# Patient Record
Sex: MALE | Race: WHITE | Employment: PART TIME | URBAN - NONMETROPOLITAN AREA
[De-identification: names, ages, dates, MRNs, and addresses within clinical notes are randomized per-mention and may not be internally consistent; named-entity substitution may affect disease eponyms.]

---

## 2017-04-25 ENCOUNTER — OFFICE VISIT (OUTPATIENT)
Dept: GASTROENTEROLOGY | Age: 62
End: 2017-04-25
Payer: COMMERCIAL

## 2017-04-25 VITALS
WEIGHT: 223 LBS | HEART RATE: 84 BPM | DIASTOLIC BLOOD PRESSURE: 70 MMHG | HEIGHT: 73 IN | SYSTOLIC BLOOD PRESSURE: 122 MMHG | OXYGEN SATURATION: 98 % | BODY MASS INDEX: 29.55 KG/M2

## 2017-04-25 DIAGNOSIS — R74.8 ELEVATED LIVER ENZYMES: ICD-10-CM

## 2017-04-25 DIAGNOSIS — K21.9 GASTROESOPHAGEAL REFLUX DISEASE WITHOUT ESOPHAGITIS: ICD-10-CM

## 2017-04-25 DIAGNOSIS — K76.0 HEPATIC STEATOSIS: ICD-10-CM

## 2017-04-25 DIAGNOSIS — K21.9 GASTROESOPHAGEAL REFLUX DISEASE, ESOPHAGITIS PRESENCE NOT SPECIFIED: Primary | ICD-10-CM

## 2017-04-25 DIAGNOSIS — Z83.79 FAMILY HISTORY OF CIRRHOSIS OF LIVER: ICD-10-CM

## 2017-04-25 DIAGNOSIS — K75.81 NASH (NONALCOHOLIC STEATOHEPATITIS): ICD-10-CM

## 2017-04-25 LAB
ALBUMIN SERPL-MCNC: 4.5 G/DL (ref 3.5–5.2)
ALP BLD-CCNC: 64 U/L (ref 40–130)
ALT SERPL-CCNC: 55 U/L (ref 5–41)
ANION GAP SERPL CALCULATED.3IONS-SCNC: 19 MMOL/L (ref 7–19)
AST SERPL-CCNC: 34 U/L (ref 5–40)
BILIRUB SERPL-MCNC: 0.3 MG/DL (ref 0.2–1.2)
BUN BLDV-MCNC: 19 MG/DL (ref 6–20)
CALCIUM SERPL-MCNC: 9.5 MG/DL (ref 8.6–10)
CHLORIDE BLD-SCNC: 103 MMOL/L (ref 98–111)
CO2: 20 MMOL/L (ref 22–29)
CREAT SERPL-MCNC: 1.2 MG/DL (ref 0.5–1.2)
GFR NON-AFRICAN AMERICAN: >60
GLOBULIN: 2.6 G/DL
GLUCOSE BLD-MCNC: 95 MG/DL (ref 74–109)
HCT VFR BLD CALC: 47 % (ref 42–52)
HEMOGLOBIN: 16.1 G/DL (ref 14–18)
INR BLD: 1.02 (ref 0.88–1.18)
MCH RBC QN AUTO: 28.8 PG (ref 27–31)
MCHC RBC AUTO-ENTMCNC: 34.3 G/DL (ref 33–37)
MCV RBC AUTO: 84.1 FL (ref 80–94)
PDW BLD-RTO: 12.7 % (ref 11.5–14.5)
PLATELET # BLD: 175 K/UL (ref 130–400)
PMV BLD AUTO: 9.4 FL (ref 7.4–10.4)
POTASSIUM SERPL-SCNC: 4 MMOL/L (ref 3.5–5)
PROTHROMBIN TIME: 13.4 SEC (ref 12–14.6)
RBC # BLD: 5.59 M/UL (ref 4.7–6.1)
SODIUM BLD-SCNC: 142 MMOL/L (ref 136–145)
TOTAL PROTEIN: 7.1 G/DL (ref 6.6–8.7)
WBC # BLD: 8.3 K/UL (ref 4.8–10.8)

## 2017-04-25 PROCEDURE — 99214 OFFICE O/P EST MOD 30 MIN: CPT | Performed by: NURSE PRACTITIONER

## 2017-04-25 RX ORDER — RANITIDINE 300 MG/1
300 TABLET ORAL DAILY
Qty: 90 TABLET | Refills: 3 | Status: SHIPPED | OUTPATIENT
Start: 2017-04-25 | End: 2021-01-12

## 2017-04-25 RX ORDER — LANOLIN ALCOHOL/MO/W.PET/CERES
3 CREAM (GRAM) TOPICAL NIGHTLY PRN
COMMUNITY
End: 2021-01-12

## 2017-04-25 ASSESSMENT — ENCOUNTER SYMPTOMS
VOICE CHANGE: 0
DIARRHEA: 0
CONSTIPATION: 0
ABDOMINAL DISTENTION: 0
SHORTNESS OF BREATH: 0
COUGH: 0
BLOOD IN STOOL: 0
CHOKING: 0
TROUBLE SWALLOWING: 0
ABDOMINAL PAIN: 0
NAUSEA: 0
RECTAL PAIN: 0
VOMITING: 0

## 2017-05-04 ENCOUNTER — HOSPITAL ENCOUNTER (OUTPATIENT)
Dept: ULTRASOUND IMAGING | Age: 62
Discharge: HOME | End: 2017-05-04
Payer: COMMERCIAL

## 2017-05-04 DIAGNOSIS — K76.0 HEPATIC STEATOSIS: ICD-10-CM

## 2017-05-04 PROCEDURE — 76705 ECHO EXAM OF ABDOMEN: CPT

## 2017-05-15 ENCOUNTER — TELEPHONE (OUTPATIENT)
Dept: GASTROENTEROLOGY | Age: 62
End: 2017-05-15

## 2019-08-02 ENCOUNTER — HOSPITAL ENCOUNTER (OUTPATIENT)
Dept: MRI IMAGING | Age: 64
Discharge: HOME | End: 2019-08-02
Payer: COMMERCIAL

## 2019-08-02 ENCOUNTER — HOSPITAL ENCOUNTER (OUTPATIENT)
Dept: GENERAL RADIOLOGY | Age: 64
Discharge: HOME | End: 2019-08-02
Payer: COMMERCIAL

## 2019-08-02 DIAGNOSIS — M25.552 LEFT HIP PAIN: ICD-10-CM

## 2019-08-02 PROCEDURE — 6360000004 HC RX CONTRAST MEDICATION: Performed by: ORTHOPAEDIC SURGERY

## 2019-08-02 PROCEDURE — 73722 MRI JOINT OF LWR EXTR W/DYE: CPT

## 2019-08-02 PROCEDURE — 2709999900 FL GUIDED NEEDLE PLACEMENT

## 2019-08-02 PROCEDURE — A9577 INJ MULTIHANCE: HCPCS | Performed by: ORTHOPAEDIC SURGERY

## 2019-08-02 PROCEDURE — 27093 INJECTION FOR HIP X-RAY: CPT

## 2019-08-02 RX ADMIN — IOPAMIDOL 5 ML: 408 INJECTION, SOLUTION INTRATHECAL at 09:20

## 2019-08-02 RX ADMIN — GADOBENATE DIMEGLUMINE 2 ML: 529 INJECTION, SOLUTION INTRAVENOUS at 10:16

## 2021-01-12 RX ORDER — COVID-19 ANTIGEN TEST
1 KIT MISCELLANEOUS PRN
COMMUNITY

## 2021-01-12 RX ORDER — FLUOXETINE HYDROCHLORIDE 20 MG/1
20 CAPSULE ORAL DAILY
COMMUNITY
End: 2021-01-18 | Stop reason: ALTCHOICE

## 2021-01-12 RX ORDER — FAMOTIDINE 20 MG/1
20 TABLET, FILM COATED ORAL PRN
COMMUNITY

## 2021-01-12 RX ORDER — LISINOPRIL AND HYDROCHLOROTHIAZIDE 12.5; 1 MG/1; MG/1
1 TABLET ORAL EVERY MORNING
COMMUNITY

## 2021-01-18 RX ORDER — FLUOXETINE 10 MG/1
10 CAPSULE ORAL DAILY
Status: ON HOLD | COMMUNITY
End: 2022-04-21 | Stop reason: HOSPADM

## 2021-01-18 RX ORDER — FLUOXETINE HYDROCHLORIDE 20 MG/1
20 CAPSULE ORAL DAILY
COMMUNITY

## 2021-01-20 ENCOUNTER — VIRTUAL VISIT (OUTPATIENT)
Dept: GASTROENTEROLOGY | Age: 66
End: 2021-01-20
Payer: COMMERCIAL

## 2021-01-20 DIAGNOSIS — Z83.49 FAMILY HISTORY OF ALPHA 1 ANTITRYPSIN DEFICIENCY: ICD-10-CM

## 2021-01-20 DIAGNOSIS — K76.0 FATTY LIVER: Primary | ICD-10-CM

## 2021-01-20 DIAGNOSIS — Z83.79 FAMILY HISTORY OF CIRRHOSIS OF LIVER: ICD-10-CM

## 2021-01-20 PROCEDURE — 99203 OFFICE O/P NEW LOW 30 MIN: CPT | Performed by: NURSE PRACTITIONER

## 2021-01-20 ASSESSMENT — ENCOUNTER SYMPTOMS
VOICE CHANGE: 0
VOMITING: 0
SHORTNESS OF BREATH: 0
BLOOD IN STOOL: 0
BACK PAIN: 1
RECTAL PAIN: 0
SORE THROAT: 0
CONSTIPATION: 0
ABDOMINAL PAIN: 0
DIARRHEA: 0
COUGH: 0
ABDOMINAL DISTENTION: 0
NAUSEA: 0
CHEST TIGHTNESS: 0

## 2021-01-20 NOTE — PROGRESS NOTES
2021    TELEHEALTH EVALUATION -- Audio/Visual (During IGMMB-73 public health emergency)    Place of Service: Patient Home      PCP:  Ivan Brown  Referred by:  No ref. provider found      HPI:    Richardo Lanes (:  1955) has requested an audio/video evaluation for the following concern(s):    Chief Complaint   Patient presents with    Hepatic Disease       Referred for fatty liver. He presented in  with elevated liver enzymes and family history of cirrhosis. He states that his mother is carrier for alpha-1 antitrypsin deficiency. He had full evaluation at that time for underlying liver disease. Noted that alpha-1 antitrypsin was below normal (79). Phenotyping was requested and noted as normal.   He has uncle and father  with cirrhosis r/t SALDA[place]A.      He denies any risk factors for liver disease. He does not drink alcohol. Rarely uses acetaminophen. He had liver biopsy in  noting moderate steatosis, no fibrosis. He has been followed by his pcp with labs every 6 months. Reviewed labs sent with referral note - unremarkable. Transaminases wnl. No liver u/s report since . The patient denies abdominal or flank pain, anorexia, nausea or vomiting, dysphagia, change in bowel habits or black or bloody stools or weight loss. No jaundice, icterus, easy bruising/bleeding, itching reported. Weight stable. No abdominal distention. ASSESSMENT/PLAN:    1. Fatty liver    2. Family history of cirrhosis of liver    3. Family history of alpha 1 antitrypsin deficiency      Plan:   Continue labs done at PCP every 6 months. Will schedule u/s of liver. Recommend this checked every 2-3 years. Orders Placed This Encounter   Procedures    US LIVER     Will call with results and recommendations. Screening for colon cancer up to date. rto prn problems. Will place in recall for repeat u/s 2 yrs pending results of test ordered today.        Review of Systems  Erectile dysfunction 2008    Fatty liver 2010    stage 1    History of gallstones     History of hematuria     Hypogonadism male 2010    Low testosterone 2010    Vitamin D deficiency 2010   ,   Past Surgical History:   Procedure Laterality Date    APPENDECTOMY  age 17    CHOLECYSTECTOMY, LAPAROSCOPIC  11-1-09    [place] Dakota Craft    COLONOSCOPY  10-27-09    [place]-ce[facility] biopsy was submucosal lipoma    COLONOSCOPY  04/20/2018    LIVER BIOPSY  11-1-09    [place]-Dr Yaa Doyle-fatty change with mild focal portal fibrosis    TONSILLECTOMY AND ADENOIDECTOMY  age 14   [facility] UPPER GASTROINTESTINAL ENDOSCOPY      1975??    UPPER GASTROINTESTINAL ENDOSCOPY  5/8/2014    [facility]   ,   Family History   Problem Relation Age of Onset    Heart Attack Father 36        premature CHD    Liver Disease Father         fatty liver    Cirrhosis Father     Cirrhosis Paternal Uncle     Liver Disease Sister     Cancer Maternal Uncle         bladder cancer    Liver Disease Paternal Uncle     Liver Disease Paternal Cousin     Colon Cancer Neg Hx     Colon Polyps Neg Hx     Esophageal Cancer Neg Hx     Liver Cancer Neg Hx     Rectal Cancer Neg Hx     Stomach Cancer Neg Hx        PHYSICAL EXAMINATION:  [ INSTRUCTIONS:  \"[x]\" Indicates a positive item  \"[]\" Indicates a negative item  -- DELETE ALL ITEMS NOT EXAMINED]      Constitutional: [x] Appears well-developed and well-nourished [x] No apparent distress      [] Abnormal   Mental status  [x] Alert and awake  [x] Oriented to person/place/time [x]Able to follow commands        Eyes:  EOM    [x]  Normal  [] Abnormal-  Sclera  [x]  Normal  [] Abnormal -          HENT:   [x] Normocephalic, atraumatic.   [] Abnormal   [x] Mouth/Throat: Mucous membranes are moist.     Neck: [x] No visualized mass     Pulmonary/Chest: [x] Respiratory effort normal.  [x] No visualized signs of difficulty breathing or respiratory distress        [] Abnormal Musculoskeletal:   [x] Normal range of motion of neck         [] Abnormal       Neurological:        [x] No Facial Asymmetry (Cranial nerve 7 motor function) (limited exam to video visit)          [] Abnormal         Skin:        [x] No significant exanthematous lesions or discoloration noted on facial skin         [] Abnormal            Psychiatric:       [x] Normal Affect [] Abnormal        [x] No Confusion or memory problems      Other pertinent observable physical exam findings:-    Due to this being a TeleHealth encounter, evaluation of the following organ systems is limited: Vitals/Constitutional/EENT/Resp/CV/GI//MS/Neuro/Skin/Heme-Lymph-Imm. An  electronic signature was used to authenticate this note. --SHELBIE Yuen on 1/20/2021 at 7:51 AM        Pursuant to the emergency declaration under the [facility] [place], [facility] waiver authority and the [employer] Act, this Virtual  Visit was conducted, with patient's consent, to reduce the patient's risk of exposure to COVID-19 and provide continuity of care for an established patient. Services were provided through a video synchronous discussion virtually to substitute for in-person clinic visit.

## 2021-07-27 ENCOUNTER — VIRTUAL VISIT (OUTPATIENT)
Dept: GASTROENTEROLOGY | Age: 66
End: 2021-07-27
Payer: COMMERCIAL

## 2021-07-27 DIAGNOSIS — E66.3 OVERWEIGHT (BMI 25.0-29.9): ICD-10-CM

## 2021-07-27 DIAGNOSIS — K76.0 FATTY LIVER DETERMINED BY BIOPSY: Primary | ICD-10-CM

## 2021-07-27 PROCEDURE — 99214 OFFICE O/P EST MOD 30 MIN: CPT | Performed by: NURSE PRACTITIONER

## 2021-07-27 ASSESSMENT — ENCOUNTER SYMPTOMS
ANAL BLEEDING: 0
SORE THROAT: 0
VOMITING: 0
BACK PAIN: 0
BLOOD IN STOOL: 0
CONSTIPATION: 0
TROUBLE SWALLOWING: 0
DIARRHEA: 0
COUGH: 0
ABDOMINAL PAIN: 0
SHORTNESS OF BREATH: 0
VOICE CHANGE: 0
ABDOMINAL DISTENTION: 0
NAUSEA: 0
RECTAL PAIN: 0

## 2021-07-27 NOTE — PATIENT INSTRUCTIONS
Recommendations for Fatty Liver disease    Here are a few modifications you can make to your diet and lifestyle:   Consume fiber rich foods as much as possible.  Eat until you are no longer hungry but not until you are full.  Increase the frequency of your consumption of white meats.  Eat fresh fruits.  Eat green vegetables daily in addition to other vegetables.  Regularly eat foods like brown rice because they have complex carbohydrates which are good for you. Avoiding some foods and beverages can help reduce the progression of fatty liver disease. Here are some of the things you should avoid:   alcoholic beverages    soda, fruit juice that is not 100% fruit juice and all other high sugar beverages    dark meats    fried foods    simple carb rich foods such as candy, and sweets    saturated fats should be avoided whenever possible     A fatty liver diet plan needs to be strict in order to reduce the possibility of further damage to the liver and other organs. The sacrifices as far as food and beverages are concerned are minimal when you consider the risks if these sacrifices are not made. Recommendation for consultation with dietician may be made and is a very important part of your care. In addition to eating right, exercising regularly and gradual weight reduction will increase your overall health as well as improve your livers health. If you are overweight you should lose 10% of your total body weight.

## 2021-07-27 NOTE — PROGRESS NOTES
2021   Pt location: his place of employment    TELEHEALTH EVALUATION -- Audio/Visual (During RJAIN-11 public health emergency)    HPI:    Hanna Bird (:  1955) has requested an audio/video evaluation for the following concern(s):    Pt made a f/u appt. New pt to me. Previous pt of JOSIE Ramos Made an appt for fatty liver surveillance. Liver bx  noted steatosis, no fibrosis. BMI 29. He has no GI complaints. Colonoscopy is current. Review of Systems   Constitutional: Negative for appetite change, fatigue, fever and unexpected weight change. HENT: Negative for sore throat, trouble swallowing and voice change. Respiratory: Negative for cough and shortness of breath. Cardiovascular: Negative for chest pain, palpitations and leg swelling. Gastrointestinal: Negative for abdominal distention, abdominal pain, anal bleeding, blood in stool, constipation, diarrhea, nausea, rectal pain and vomiting. Genitourinary: Negative for hematuria. Musculoskeletal: Negative for arthralgias, back pain and neck pain. Neurological: Negative for dizziness, weakness, light-headedness and headaches. Psychiatric/Behavioral: Negative for dysphoric mood and sleep disturbance. The patient is not nervous/anxious. All other systems reviewed and are negative. Prior to Visit Medications    Medication Sig Taking?  Authorizing Provider   FLUoxetine (PROZAC) 20 MG capsule Take 20 mg by mouth daily  Historical Provider, MD   FLUoxetine (PROZAC) 10 MG capsule Take 10 mg by mouth daily  Historical Provider, MD   Naproxen Sodium (ALEVE) 220 MG CAPS Take 1 tablet by mouth as needed for Pain  Historical Provider, MD   lisinopril-hydroCHLOROthiazide (PRINZIDE;ZESTORETIC) 10-12.5 MG per tablet Take 1 tablet by mouth every morning  Historical Provider, MD   famotidine (PEPCID) 20 MG tablet Take 20 mg by mouth as needed  Historical Provider, MD       Social History     Tobacco Use    Smoking status: Never Smoker    Smokeless tobacco: Never Used   Vaping Use    Vaping Use: Never used   Substance Use Topics    Alcohol use: No    Drug use: No        PHYSICAL EXAMINATION:  [ INSTRUCTIONS:  \"[x]\" Indicates a positive item  \"[]\" Indicates a negative item  -- DELETE ALL ITEMS NOT EXAMINED]  Vital Signs: (As obtained by patient/caregiver or practitioner observation)    Blood pressure-  Heart rate-    Respiratory rate-    Temperature-  Pulse oximetry-     Constitutional: [x] Appears well-developed and well-nourished [x] No apparent distress      [] Abnormal-   Mental status  [x] Alert and awake  [x] Oriented to person/place/time [x]Able to follow commands      Eyes:  EOM    [x]  Normal  [] Abnormal-  Sclera  [x]  Normal  [] Abnormal -         Discharge [x]  None visible  [] Abnormal -    HENT:   [x] Normocephalic, atraumatic. [] Abnormal   [x] Mouth/Throat: Mucous membranes are moist.     External Ears [x] Normal  [] Abnormal-     Neck: [x] No visualized mass     Pulmonary/Chest: [x] Respiratory effort normal.  [x] No visualized signs of difficulty breathing or respiratory distress        [] Abnormal-      Musculoskeletal:   [] Normal gait with no signs of ataxia         [x] Normal range of motion of neck        [] Abnormal-       Neurological:        [x] No Facial Asymmetry (Cranial nerve 7 motor function) (limited exam to video visit)          [x] No gaze palsy        [] Abnormal-         Skin:        [x] No significant exanthematous lesions or discoloration noted on facial skin         [] Abnormal-            Psychiatric:       [x] Normal Affect [x] No Hallucinations        [] Abnormal-     Other pertinent observable physical exam findings-     ASSESSMENT/PLAN:  1. Fatty liver determined by biopsy  - US LIVER; Future  - Hepatic Function Panel; Future  -will call him with results    2. Overweight (BMI 25.0-29.9)  - US LIVER; Future  -advised about the importance of weight loss to get to goal BMI    3.  F/u in 1 year for fatty liver surveillance with labs/US prior    Alejandra Felt, was evaluated through a synchronous (real-time) audio-video encounter. The patient (or guardian if applicable) is aware that this is a billable service. Verbal consent to proceed has been obtained within the past 12 months. The visit was conducted pursuant to the emergency declaration under the [address] [place] [address] authority and the "[employer] and [employer] Act. Patient identification was verified, and a caregiver was present when appropriate. The patient was located in a state where the provider was credentialed to provide care. Total time spent on this encounter: 30 min    --SHELBIE Machuca on 7/27/2021 at 9:30 AM    An electronic signature was used to authenticate this note.

## 2021-08-11 NOTE — PROGRESS NOTES
"Subjective    Mr. Bennett is 65 y.o. male    Chief Complaint: Erectile dysfunction    History of Present Illness    65-year-old male new patient with hypertension here for worsening erectile dysfunction refractory to all previous medical therapy with different oral PDE inhibitors.  He is also noted loss of length and girth.  He denies bothersome LUTS, hematuria, or dysuria.  UA today is clear.      The following portions of the patient's history were reviewed and updated as appropriate: allergies, current medications, past family history, past medical history, past social history, past surgical history and problem list.    Review of Systems      Current Outpatient Medications:   •  famotidine (PEPCID) 20 MG tablet, Take 20 mg by mouth., Disp: , Rfl:   •  FLUoxetine (PROzac) 10 MG capsule, Take 10 mg by mouth., Disp: , Rfl:   •  FLUoxetine (PROzac) 20 MG capsule, Take 20 mg by mouth., Disp: , Rfl:   •  lisinopril-hydrochlorothiazide (PRINZIDE,ZESTORETIC) 10-12.5 MG per tablet, Take 1 tablet by mouth., Disp: , Rfl:   •  naproxen sodium (Aleve) 220 MG tablet, once a week as needed for pain, Disp: , Rfl:     Past Medical History:   Diagnosis Date   • Depression    • Fatty liver    • Hypertension        Past Surgical History:   Procedure Laterality Date   • GALLBLADDER SURGERY     • LIVER BIOPSY         Social History     Socioeconomic History   • Marital status:      Spouse name: Not on file   • Number of children: Not on file   • Years of education: Not on file   • Highest education level: Not on file   Tobacco Use   • Smoking status: Never Smoker   • Smokeless tobacco: Never Used   Vaping Use   • Vaping Use: Never used   Substance and Sexual Activity   • Alcohol use: Never   • Drug use: Never   • Sexual activity: Defer       Family History   Problem Relation Age of Onset   • Liver disease Father    • No Known Problems Mother        Objective    Temp 97.3 °F (36.3 °C)   Ht 185.4 cm (73\")   Wt 101 kg (223 lb 9.6 " oz)   BMI 29.50 kg/m²     Physical Exam  Penis and testicles are normal.  No masses.  No inguinal hernia palpable.      Results for orders placed or performed in visit on 08/12/21   POC Urinalysis Dipstick, Multipro    Specimen: Urine   Result Value Ref Range    Color Yellow Yellow, Straw, Dark Yellow, Treva    Clarity, UA Clear Clear    Glucose, UA Negative Negative, 1000 mg/dL (3+) mg/dL    Bilirubin Negative Negative    Ketones, UA Negative Negative    Specific Gravity  1.020 1.005 - 1.030    Blood, UA Negative Negative    pH, Urine 7.0 5.0 - 8.0    Protein, POC Negative Negative mg/dL    Urobilinogen, UA Normal Normal    Nitrite, UA Negative Negative    Leukocytes Negative Negative     Assessment and Plan    Diagnoses and all orders for this visit:    1. Erectile dysfunction due to arterial insufficiency (Primary)  -     POC Urinalysis Dipstick, Multipro  -     Case Request; Standing  -     CBC & Differential; Future  -     Basic Metabolic Panel; Future  -     vancomycin (VANCOCIN) 1,000 mg in sodium chloride 0.9 % 250 mL IVPB  -     gentamicin (GARAMYCIN) 560 mg in sodium chloride 0.9 % 100 mL IVPB  -     Case Request    2. Essential hypertension    Other orders  -     Follow Anesthesia Guidelines / Standing Orders; Future  -     Obtain Informed Consent; Future  -     Provide NPO Instructions to Patient; Future  -     Chlorhexidine Skin Prep; Future  -     Follow Anesthesia Guidelines / Standing Orders; Standing  -     Verify / Perform Chlorhexidine Skin Prep; Standing  -     Verify / Perform Chlorhexidine Skin Prep if Indicated (If Not Already Completed); Standing  -     ECG 12 Lead; Standing      Worsening erectile dysfunction refractory to all medical therapy.  We discussed other treatment options including penile injections, intraurethral medications, vacuum erection device, and penile implant.  Patient would like to schedule three-piece inflatable penile implant.  We discussed risks of the procedure  including not limited to infection, bleeding, need for additional procedures, injury to urethra and/or adjacent structures, chronic pain, mechanical malfunction, device ligation, complications of anesthesia.  He discussed that his penile length with implant would be no longer than his current stretched flaccid penile length.  He voices understanding provide informed sent to proceed with three-piece inflatable penile implant 3/31/2021.      This document has been signed by ROLAN Jaeger MD on August 14, 2021 15:18 CDT

## 2021-08-12 ENCOUNTER — OFFICE VISIT (OUTPATIENT)
Dept: UROLOGY | Facility: CLINIC | Age: 66
End: 2021-08-12

## 2021-08-12 VITALS — BODY MASS INDEX: 29.63 KG/M2 | WEIGHT: 223.6 LBS | HEIGHT: 73 IN | TEMPERATURE: 97.3 F

## 2021-08-12 DIAGNOSIS — I10 ESSENTIAL HYPERTENSION: ICD-10-CM

## 2021-08-12 DIAGNOSIS — N52.01 ERECTILE DYSFUNCTION DUE TO ARTERIAL INSUFFICIENCY: Primary | ICD-10-CM

## 2021-08-12 PROBLEM — N52.9 ERECTILE DYSFUNCTION: Status: ACTIVE | Noted: 2021-08-12

## 2021-08-12 LAB
BILIRUB BLD-MCNC: NEGATIVE MG/DL
CLARITY, POC: CLEAR
COLOR UR: YELLOW
GLUCOSE UR STRIP-MCNC: NEGATIVE MG/DL
KETONES UR QL: NEGATIVE
LEUKOCYTE EST, POC: NEGATIVE
NITRITE UR-MCNC: NEGATIVE MG/ML
PH UR: 7 [PH] (ref 5–8)
PROT UR STRIP-MCNC: NEGATIVE MG/DL
RBC # UR STRIP: NEGATIVE /UL
SP GR UR: 1.02 (ref 1–1.03)
UROBILINOGEN UR QL: NORMAL

## 2021-08-12 PROCEDURE — 99204 OFFICE O/P NEW MOD 45 MIN: CPT | Performed by: UROLOGY

## 2021-08-12 PROCEDURE — 81001 URINALYSIS AUTO W/SCOPE: CPT | Performed by: UROLOGY

## 2021-08-12 RX ORDER — FAMOTIDINE 20 MG/1
20 TABLET, FILM COATED ORAL TAKE AS DIRECTED
COMMUNITY
End: 2023-03-15

## 2021-08-12 RX ORDER — NAPROXEN SODIUM 220 MG
TABLET ORAL
COMMUNITY
End: 2022-11-02

## 2021-08-12 RX ORDER — LISINOPRIL AND HYDROCHLOROTHIAZIDE 12.5; 1 MG/1; MG/1
1 TABLET ORAL DAILY
COMMUNITY
End: 2023-03-15

## 2021-08-12 RX ORDER — FLUOXETINE HYDROCHLORIDE 20 MG/1
20 CAPSULE ORAL DAILY
COMMUNITY
End: 2022-11-02

## 2021-08-12 RX ORDER — FLUOXETINE 10 MG/1
10 CAPSULE ORAL DAILY
COMMUNITY
End: 2023-03-15

## 2021-08-12 NOTE — PATIENT INSTRUCTIONS
"BMI for Adults  What is BMI?  Body mass index (BMI) is a number that is calculated from a person's weight and height. BMI can help estimate how much of a person's weight is composed of fat. BMI does not measure body fat directly. Rather, it is an alternative to procedures that directly measure body fat, which can be difficult and expensive.  BMI can help identify people who may be at higher risk for certain medical problems.  What are BMI measurements used for?  BMI is used as a screening tool to identify possible weight problems. It helps determine whether a person is obese, overweight, a healthy weight, or underweight.  BMI is useful for:  · Identifying a weight problem that may be related to a medical condition or may increase the risk for medical problems.  · Promoting changes, such as changes in diet and exercise, to help reach a healthy weight. BMI screening can be repeated to see if these changes are working.  How is BMI calculated?  BMI involves measuring your weight in relation to your height. Both height and weight are measured, and the BMI is calculated from those numbers. This can be done either in English (U.S.) or metric measurements. Note that charts and online BMI calculators are available to help you find your BMI quickly and easily without having to do these calculations yourself.  To calculate your BMI in English (U.S.) measurements:    1. Measure your weight in pounds (lb).  2. Multiply the number of pounds by 703.  ? For example, for a person who weighs 180 lb, multiply that number by 703, which equals 126,540.  3. Measure your height in inches. Then multiply that number by itself to get a measurement called \"inches squared.\"  ? For example, for a person who is 70 inches tall, the \"inches squared\" measurement is 70 inches x 70 inches, which equals 4,900 inches squared.  4. Divide the total from step 2 (number of lb x 703) by the total from step 3 (inches squared): 126,540 ÷ 4,900 = 25.8. This is " "your BMI.  To calculate your BMI in metric measurements:  1. Measure your weight in kilograms (kg).  2. Measure your height in meters (m). Then multiply that number by itself to get a measurement called \"meters squared.\"  ? For example, for a person who is 1.75 m tall, the \"meters squared\" measurement is 1.75 m x 1.75 m, which is equal to 3.1 meters squared.  3. Divide the number of kilograms (your weight) by the meters squared number. In this example: 70 ÷ 3.1 = 22.6. This is your BMI.  What do the results mean?  BMI charts are used to identify whether you are underweight, normal weight, overweight, or obese. The following guidelines will be used:  · Underweight: BMI less than 18.5.  · Normal weight: BMI between 18.5 and 24.9.  · Overweight: BMI between 25 and 29.9.  · Obese: BMI of 30 or above.  Keep these notes in mind:  · Weight includes both fat and muscle, so someone with a muscular build, such as an athlete, may have a BMI that is higher than 24.9. In cases like these, BMI is not an accurate measure of body fat.  · To determine if excess body fat is the cause of a BMI of 25 or higher, further assessments may need to be done by a health care provider.  · BMI is usually interpreted in the same way for men and women.  Where to find more information  For more information about BMI, including tools to quickly calculate your BMI, go to these websites:  · Centers for Disease Control and Prevention: www.cdc.gov  · American Heart Association: www.heart.org  · National Heart, Lung, and Blood [place]: www.nhlbi.nih.gov  Summary  · Body mass index (BMI) is a number that is calculated from a person's weight and height.  · BMI may help estimate how much of a person's weight is composed of fat. BMI can help identify those who may be at higher risk for certain medical problems.  · BMI can be measured using English measurements or metric measurements.  · BMI charts are used to identify whether you are underweight, normal " weight, overweight, or obese.  This information is not intended to replace advice given to you by your health care provider. Make sure you discuss any questions you have with your health care provider.  Document Revised: 09/09/2020 Document Reviewed: 07/17/2020  Elsevier Patient Education © 2021 [employer] Inc.

## 2021-08-16 ENCOUNTER — HOSPITAL ENCOUNTER (OUTPATIENT)
Dept: ULTRASOUND IMAGING | Age: 66
Discharge: HOME | End: 2021-08-16
Payer: COMMERCIAL

## 2021-08-16 DIAGNOSIS — K76.0 FATTY LIVER DETERMINED BY BIOPSY: ICD-10-CM

## 2021-08-16 DIAGNOSIS — E66.3 OVERWEIGHT (BMI 25.0-29.9): ICD-10-CM

## 2021-08-16 PROCEDURE — 76705 ECHO EXAM OF ABDOMEN: CPT

## 2021-08-19 ENCOUNTER — TELEPHONE (OUTPATIENT)
Dept: GASTROENTEROLOGY | Age: 66
End: 2021-08-19

## 2021-08-19 DIAGNOSIS — K76.89 HEPATIC CYST: Primary | ICD-10-CM

## 2021-08-19 NOTE — TELEPHONE ENCOUNTER
Please let pt know I have reviewed the liver ultrasound. It revealed liver cysts. I reviewed the last US in epic, which was in 2017, and there is no change in size of these cysts. He needs to complete the HFP I previously ordered. Lets get an AFP too. Order for this placed.  thanks

## 2021-08-26 ENCOUNTER — TRANSCRIBE ORDERS (OUTPATIENT)
Dept: LAB | Facility: HOSPITAL | Age: 66
End: 2021-08-26

## 2021-08-26 DIAGNOSIS — Z11.59 SCREENING FOR VIRAL DISEASE: Primary | ICD-10-CM

## 2021-08-30 ENCOUNTER — PRE-ADMISSION TESTING (OUTPATIENT)
Dept: PREADMISSION TESTING | Facility: HOSPITAL | Age: 66
End: 2021-08-30

## 2021-08-30 ENCOUNTER — LAB (OUTPATIENT)
Dept: LAB | Facility: HOSPITAL | Age: 66
End: 2021-08-30

## 2021-08-30 VITALS
DIASTOLIC BLOOD PRESSURE: 80 MMHG | HEIGHT: 72 IN | SYSTOLIC BLOOD PRESSURE: 134 MMHG | RESPIRATION RATE: 16 BRPM | BODY MASS INDEX: 30.31 KG/M2 | WEIGHT: 223.77 LBS | OXYGEN SATURATION: 98 % | HEART RATE: 63 BPM

## 2021-08-30 DIAGNOSIS — Z01.818 PREOP EXAMINATION: Primary | ICD-10-CM

## 2021-08-30 LAB — SARS-COV-2 ORF1AB RESP QL NAA+PROBE: NOT DETECTED

## 2021-08-30 PROCEDURE — 80048 BASIC METABOLIC PNL TOTAL CA: CPT | Performed by: UROLOGY

## 2021-08-30 PROCEDURE — 93010 ELECTROCARDIOGRAM REPORT: CPT | Performed by: INTERNAL MEDICINE

## 2021-08-30 PROCEDURE — 85025 COMPLETE CBC W/AUTO DIFF WBC: CPT | Performed by: UROLOGY

## 2021-08-30 PROCEDURE — U0004 COV-19 TEST NON-CDC HGH THRU: HCPCS

## 2021-08-30 PROCEDURE — 93005 ELECTROCARDIOGRAM TRACING: CPT | Performed by: UROLOGY

## 2021-08-30 PROCEDURE — C9803 HOPD COVID-19 SPEC COLLECT: HCPCS

## 2021-08-31 ENCOUNTER — ANESTHESIA EVENT (OUTPATIENT)
Dept: PERIOP | Facility: HOSPITAL | Age: 66
End: 2021-08-31

## 2021-08-31 ENCOUNTER — ANESTHESIA (OUTPATIENT)
Dept: PERIOP | Facility: HOSPITAL | Age: 66
End: 2021-08-31

## 2021-08-31 ENCOUNTER — HOSPITAL ENCOUNTER (OUTPATIENT)
Facility: HOSPITAL | Age: 66
Setting detail: HOSPITAL OUTPATIENT SURGERY
Discharge: HOME | End: 2021-08-31
Attending: UROLOGY | Admitting: UROLOGY

## 2021-08-31 VITALS
SYSTOLIC BLOOD PRESSURE: 142 MMHG | TEMPERATURE: 97 F | RESPIRATION RATE: 16 BRPM | HEART RATE: 61 BPM | OXYGEN SATURATION: 95 % | DIASTOLIC BLOOD PRESSURE: 78 MMHG

## 2021-08-31 DIAGNOSIS — N52.01 ERECTILE DYSFUNCTION DUE TO ARTERIAL INSUFFICIENCY: ICD-10-CM

## 2021-08-31 PROCEDURE — C1889 IMPLANT/INSERT DEVICE, NOC: HCPCS | Performed by: UROLOGY

## 2021-08-31 PROCEDURE — 25010000002 VANCOMYCIN 1 G RECONSTITUTED SOLUTION 1 EACH VIAL: Performed by: UROLOGY

## 2021-08-31 PROCEDURE — 54405 INSERT MULTI-COMP PENIS PROS: CPT | Performed by: UROLOGY

## 2021-08-31 PROCEDURE — 25010000002 PROPOFOL 10 MG/ML EMULSION: Performed by: NURSE ANESTHETIST, CERTIFIED REGISTERED

## 2021-08-31 PROCEDURE — 25010000002 GENTAMICIN PER 80 MG: Performed by: UROLOGY

## 2021-08-31 PROCEDURE — 25010000002 FENTANYL CITRATE (PF) 100 MCG/2ML SOLUTION: Performed by: NURSE ANESTHETIST, CERTIFIED REGISTERED

## 2021-08-31 PROCEDURE — C1813 PROSTHESIS, PENILE, INFLATAB: HCPCS | Performed by: UROLOGY

## 2021-08-31 PROCEDURE — 25010000002 ONDANSETRON PER 1 MG: Performed by: NURSE ANESTHETIST, CERTIFIED REGISTERED

## 2021-08-31 DEVICE — CL RESERVOIR
Type: IMPLANTABLE DEVICE | Site: PENIS | Status: FUNCTIONAL
Brand: TITAN

## 2021-08-31 DEVICE — TITAN® TOUCH SCROTAL ZERO DEGREE ANGLE CYLINDER SET WITH PUMP
Type: IMPLANTABLE DEVICE | Site: PENIS | Status: FUNCTIONAL
Brand: TITAN

## 2021-08-31 RX ORDER — ACETAMINOPHEN 500 MG
1000 TABLET ORAL ONCE
Status: COMPLETED | OUTPATIENT
Start: 2021-08-31 | End: 2021-08-31

## 2021-08-31 RX ORDER — MAGNESIUM HYDROXIDE 1200 MG/15ML
LIQUID ORAL AS NEEDED
Status: DISCONTINUED | OUTPATIENT
Start: 2021-08-31 | End: 2021-08-31 | Stop reason: HOSPADM

## 2021-08-31 RX ORDER — TRAMADOL HYDROCHLORIDE 50 MG/1
50 TABLET ORAL ONCE AS NEEDED
Status: DISCONTINUED | OUTPATIENT
Start: 2021-08-31 | End: 2021-08-31 | Stop reason: HOSPADM

## 2021-08-31 RX ORDER — SODIUM CHLORIDE, SODIUM LACTATE, POTASSIUM CHLORIDE, CALCIUM CHLORIDE 600; 310; 30; 20 MG/100ML; MG/100ML; MG/100ML; MG/100ML
1000 INJECTION, SOLUTION INTRAVENOUS CONTINUOUS
Status: DISCONTINUED | OUTPATIENT
Start: 2021-08-31 | End: 2021-08-31 | Stop reason: HOSPADM

## 2021-08-31 RX ORDER — ROCURONIUM BROMIDE 10 MG/ML
INJECTION, SOLUTION INTRAVENOUS AS NEEDED
Status: DISCONTINUED | OUTPATIENT
Start: 2021-08-31 | End: 2021-08-31 | Stop reason: SURG

## 2021-08-31 RX ORDER — ONDANSETRON 2 MG/ML
4 INJECTION INTRAMUSCULAR; INTRAVENOUS ONCE AS NEEDED
Status: DISCONTINUED | OUTPATIENT
Start: 2021-08-31 | End: 2021-08-31 | Stop reason: HOSPADM

## 2021-08-31 RX ORDER — SUCCINYLCHOLINE/SOD CL,ISO/PF 200MG/10ML
SYRINGE (ML) INTRAVENOUS AS NEEDED
Status: DISCONTINUED | OUTPATIENT
Start: 2021-08-31 | End: 2021-08-31 | Stop reason: SURG

## 2021-08-31 RX ORDER — DOCUSATE SODIUM 100 MG/1
100 CAPSULE, LIQUID FILLED ORAL 2 TIMES DAILY
Qty: 60 CAPSULE | Refills: 0 | Status: SHIPPED | OUTPATIENT
Start: 2021-08-31 | End: 2023-03-15

## 2021-08-31 RX ORDER — PROPOFOL 10 MG/ML
VIAL (ML) INTRAVENOUS AS NEEDED
Status: DISCONTINUED | OUTPATIENT
Start: 2021-08-31 | End: 2021-08-31 | Stop reason: SURG

## 2021-08-31 RX ORDER — OXYCODONE AND ACETAMINOPHEN 7.5; 325 MG/1; MG/1
2 TABLET ORAL EVERY 4 HOURS PRN
Status: DISCONTINUED | OUTPATIENT
Start: 2021-08-31 | End: 2021-08-31 | Stop reason: HOSPADM

## 2021-08-31 RX ORDER — TRAMADOL HYDROCHLORIDE 50 MG/1
50 TABLET ORAL EVERY 4 HOURS PRN
Qty: 18 TABLET | Refills: 0 | Status: SHIPPED | OUTPATIENT
Start: 2021-08-31 | End: 2023-03-15

## 2021-08-31 RX ORDER — ONDANSETRON 2 MG/ML
INJECTION INTRAMUSCULAR; INTRAVENOUS AS NEEDED
Status: DISCONTINUED | OUTPATIENT
Start: 2021-08-31 | End: 2021-08-31 | Stop reason: SURG

## 2021-08-31 RX ORDER — LIDOCAINE HYDROCHLORIDE 40 MG/ML
SOLUTION TOPICAL AS NEEDED
Status: DISCONTINUED | OUTPATIENT
Start: 2021-08-31 | End: 2021-08-31 | Stop reason: SURG

## 2021-08-31 RX ORDER — EPHEDRINE SULFATE 50 MG/ML
INJECTION, SOLUTION INTRAVENOUS AS NEEDED
Status: DISCONTINUED | OUTPATIENT
Start: 2021-08-31 | End: 2021-08-31 | Stop reason: SURG

## 2021-08-31 RX ORDER — OXYCODONE AND ACETAMINOPHEN 10; 325 MG/1; MG/1
1 TABLET ORAL ONCE AS NEEDED
Status: DISCONTINUED | OUTPATIENT
Start: 2021-08-31 | End: 2021-08-31 | Stop reason: HOSPADM

## 2021-08-31 RX ORDER — NALOXONE HCL 0.4 MG/ML
0.4 VIAL (ML) INJECTION AS NEEDED
Status: DISCONTINUED | OUTPATIENT
Start: 2021-08-31 | End: 2021-08-31 | Stop reason: HOSPADM

## 2021-08-31 RX ORDER — LIDOCAINE HYDROCHLORIDE 20 MG/ML
INJECTION, SOLUTION EPIDURAL; INFILTRATION; INTRACAUDAL; PERINEURAL AS NEEDED
Status: DISCONTINUED | OUTPATIENT
Start: 2021-08-31 | End: 2021-08-31 | Stop reason: SURG

## 2021-08-31 RX ORDER — SULFAMETHOXAZOLE AND TRIMETHOPRIM 800; 160 MG/1; MG/1
1 TABLET ORAL 2 TIMES DAILY
Qty: 28 TABLET | Refills: 0 | Status: SHIPPED | OUTPATIENT
Start: 2021-08-31 | End: 2021-09-14

## 2021-08-31 RX ORDER — FENTANYL CITRATE 50 UG/ML
INJECTION, SOLUTION INTRAMUSCULAR; INTRAVENOUS AS NEEDED
Status: DISCONTINUED | OUTPATIENT
Start: 2021-08-31 | End: 2021-08-31 | Stop reason: SURG

## 2021-08-31 RX ORDER — FENTANYL CITRATE 50 UG/ML
25 INJECTION, SOLUTION INTRAMUSCULAR; INTRAVENOUS
Status: DISCONTINUED | OUTPATIENT
Start: 2021-08-31 | End: 2021-08-31 | Stop reason: HOSPADM

## 2021-08-31 RX ORDER — SODIUM CHLORIDE, SODIUM LACTATE, POTASSIUM CHLORIDE, CALCIUM CHLORIDE 600; 310; 30; 20 MG/100ML; MG/100ML; MG/100ML; MG/100ML
100 INJECTION, SOLUTION INTRAVENOUS CONTINUOUS
Status: DISCONTINUED | OUTPATIENT
Start: 2021-08-31 | End: 2021-08-31 | Stop reason: HOSPADM

## 2021-08-31 RX ORDER — LIDOCAINE HYDROCHLORIDE 10 MG/ML
0.5 INJECTION, SOLUTION EPIDURAL; INFILTRATION; INTRACAUDAL; PERINEURAL ONCE AS NEEDED
Status: DISCONTINUED | OUTPATIENT
Start: 2021-08-31 | End: 2021-08-31 | Stop reason: HOSPADM

## 2021-08-31 RX ORDER — SCOLOPAMINE TRANSDERMAL SYSTEM 1 MG/1
1 PATCH, EXTENDED RELEASE TRANSDERMAL CONTINUOUS
Status: DISCONTINUED | OUTPATIENT
Start: 2021-08-31 | End: 2021-08-31 | Stop reason: HOSPADM

## 2021-08-31 RX ORDER — MIDAZOLAM HYDROCHLORIDE 1 MG/ML
1 INJECTION INTRAMUSCULAR; INTRAVENOUS
Status: DISCONTINUED | OUTPATIENT
Start: 2021-08-31 | End: 2021-08-31 | Stop reason: HOSPADM

## 2021-08-31 RX ORDER — SODIUM CHLORIDE 0.9 % (FLUSH) 0.9 %
3-10 SYRINGE (ML) INJECTION AS NEEDED
Status: DISCONTINUED | OUTPATIENT
Start: 2021-08-31 | End: 2021-08-31 | Stop reason: HOSPADM

## 2021-08-31 RX ORDER — SODIUM CHLORIDE 0.9 % (FLUSH) 0.9 %
3 SYRINGE (ML) INJECTION AS NEEDED
Status: DISCONTINUED | OUTPATIENT
Start: 2021-08-31 | End: 2021-08-31 | Stop reason: HOSPADM

## 2021-08-31 RX ORDER — BUPIVACAINE HYDROCHLORIDE 5 MG/ML
INJECTION, SOLUTION PERINEURAL AS NEEDED
Status: DISCONTINUED | OUTPATIENT
Start: 2021-08-31 | End: 2021-08-31 | Stop reason: HOSPADM

## 2021-08-31 RX ORDER — SODIUM CHLORIDE 0.9 % (FLUSH) 0.9 %
3 SYRINGE (ML) INJECTION EVERY 12 HOURS SCHEDULED
Status: DISCONTINUED | OUTPATIENT
Start: 2021-08-31 | End: 2021-08-31 | Stop reason: HOSPADM

## 2021-08-31 RX ORDER — ONDANSETRON 4 MG/1
4 TABLET, ORALLY DISINTEGRATING ORAL EVERY 6 HOURS PRN
Qty: 10 TABLET | Refills: 1 | Status: SHIPPED | OUTPATIENT
Start: 2021-08-31 | End: 2022-11-02

## 2021-08-31 RX ORDER — LABETALOL HYDROCHLORIDE 5 MG/ML
5 INJECTION, SOLUTION INTRAVENOUS
Status: DISCONTINUED | OUTPATIENT
Start: 2021-08-31 | End: 2021-08-31 | Stop reason: HOSPADM

## 2021-08-31 RX ORDER — FLUMAZENIL 0.1 MG/ML
0.2 INJECTION INTRAVENOUS AS NEEDED
Status: DISCONTINUED | OUTPATIENT
Start: 2021-08-31 | End: 2021-08-31 | Stop reason: HOSPADM

## 2021-08-31 RX ORDER — BACITRACIN ZINC 500 [USP'U]/G
OINTMENT TOPICAL AS NEEDED
Status: DISCONTINUED | OUTPATIENT
Start: 2021-08-31 | End: 2021-08-31 | Stop reason: HOSPADM

## 2021-08-31 RX ADMIN — ACETAMINOPHEN 1000 MG: 500 TABLET, FILM COATED ORAL at 09:21

## 2021-08-31 RX ADMIN — LIDOCAINE HYDROCHLORIDE 100 MG: 20 INJECTION, SOLUTION EPIDURAL; INFILTRATION; INTRACAUDAL; PERINEURAL at 09:44

## 2021-08-31 RX ADMIN — PROPOFOL 170 MG: 10 INJECTION, EMULSION INTRAVENOUS at 09:44

## 2021-08-31 RX ADMIN — ROCURONIUM BROMIDE 10 MG: 50 INJECTION INTRAVENOUS at 09:44

## 2021-08-31 RX ADMIN — ONDANSETRON 4 MG: 2 INJECTION INTRAMUSCULAR; INTRAVENOUS at 10:34

## 2021-08-31 RX ADMIN — EPHEDRINE SULFATE 20 MG: 50 INJECTION INTRAVENOUS at 09:55

## 2021-08-31 RX ADMIN — SCOPALAMINE 1 PATCH: 1 PATCH, EXTENDED RELEASE TRANSDERMAL at 09:21

## 2021-08-31 RX ADMIN — Medication 190 MG: at 09:44

## 2021-08-31 RX ADMIN — LIDOCAINE HYDROCHLORIDE 1 EACH: 40 SOLUTION TOPICAL at 09:45

## 2021-08-31 RX ADMIN — VANCOMYCIN HYDROCHLORIDE 1000 MG: 1 INJECTION, POWDER, LYOPHILIZED, FOR SOLUTION INTRAVENOUS at 09:21

## 2021-08-31 RX ADMIN — GENTAMICIN SULFATE 560 MG: 40 INJECTION, SOLUTION INTRAMUSCULAR; INTRAVENOUS at 09:21

## 2021-08-31 RX ADMIN — FENTANYL CITRATE 100 MCG: 50 INJECTION, SOLUTION INTRAMUSCULAR; INTRAVENOUS at 09:44

## 2021-08-31 RX ADMIN — OXYCODONE HYDROCHLORIDE AND ACETAMINOPHEN 2 TABLET: 7.5; 325 TABLET ORAL at 11:07

## 2021-08-31 RX ADMIN — SODIUM CHLORIDE, POTASSIUM CHLORIDE, SODIUM LACTATE AND CALCIUM CHLORIDE 1000 ML: 600; 310; 30; 20 INJECTION, SOLUTION INTRAVENOUS at 07:01

## 2021-09-01 ENCOUNTER — TELEPHONE (OUTPATIENT)
Dept: UROLOGY | Facility: CLINIC | Age: 66
End: 2021-09-01

## 2021-09-01 DIAGNOSIS — R35.0 URINE FREQUENCY: Primary | ICD-10-CM

## 2021-09-01 LAB
QT INTERVAL: 424 MS
QTC INTERVAL: 405 MS

## 2021-09-01 RX ORDER — TAMSULOSIN HYDROCHLORIDE 0.4 MG/1
2 CAPSULE ORAL NIGHTLY
Qty: 60 CAPSULE | Refills: 0 | Status: SHIPPED | OUTPATIENT
Start: 2021-09-01 | End: 2022-11-02

## 2021-09-01 NOTE — TELEPHONE ENCOUNTER
PATIENTS WIFE CALLED, PATIENT IS COMPLAINING OF FEELING LIKE HE IS UNABLE TO EMPTY HIS BLADDER.  I ASKED IF HE IS URINATING, YES HE IS BUT IT IS SLOW AND HE IS FEELING FULL.    SHE STATES SHE HAD CALLED THIS MORNING, AND WAS CONCERNED THAT HE MAY NOT BE ABLE TO URINATE.

## 2021-09-01 NOTE — TELEPHONE ENCOUNTER
Patient called reporting urinary difficulty.  He is voiding every 15 to 30 minutes.  He is having to strain to urinate.  He denies previous prostate trouble taking prostate medications.  I recommended starting tamsulosin this evening.  He will come see me in clinic in the morning if he is still having difficulty for bladder scan and possible Rich catheter placement.

## 2021-09-01 NOTE — ANESTHESIA POSTPROCEDURE EVALUATION
Patient: Contreras Bennett    Procedure Summary     Date: 08/31/21 Room / Location:  [facility] OR 08 /  [facility]    Anesthesia Start: 0941 Anesthesia Stop: 1050    Procedure: 3-PIECE INFLATABLE PENILE PROSTHESIS PLACEMENT (N/A Penis) Diagnosis:       Erectile dysfunction, unspecified erectile dysfunction type      (Erectile dysfunction, unspecified erectile dysfunction type [N52.9])    Surgeons: Yonatan Jaeger MD Provider: Wali Park CRNA    Anesthesia Type: general ASA Status: 2          Anesthesia Type: general    Vitals  Vitals Value Taken Time   /76 08/31/21 1115   Temp 97 °F (36.1 °C) 08/31/21 1049   Pulse 55 08/31/21 1118   Resp 16 08/31/21 1110   SpO2 94 % 08/31/21 1118   Vitals shown include unvalidated device data.        Post Anesthesia Care and Evaluation    Patient location during evaluation: PACU  Patient participation: complete - patient participated  Level of consciousness: awake and alert  Pain management: adequate  Airway patency: patent  Anesthetic complications: No anesthetic complications  PONV Status: none  Cardiovascular status: acceptable and hemodynamically stable  Respiratory status: acceptable  Hydration status: acceptable    Comments: Blood pressure 142/78, pulse 61, temperature 97 °F (36.1 °C), temperature source Temporal, resp. rate 16, SpO2 95 %.    Patient discharged from PACU based upon Alissa score. Please see RN notes for further details

## 2021-09-14 NOTE — PROGRESS NOTES
Subjective    Mr. Bennett is 65 y.o. male    Chief Complaint: Postoperative visit    History of Present Illness    65-year-old male established patient with hypertension follow-up status post Coloplast Titan x3 piece implantable penile implant on 8/31/2021.  He had some problems postoperatively with urinary hesitancy and difficulty voiding.  The symptoms resolved with tamsulosin.  He states he is no longer requiring this medication and is now voiding satisfactorily.      The following portions of the patient's history were reviewed and updated as appropriate: allergies, current medications, past family history, past medical history, past social history, past surgical history and problem list.    Review of Systems   Constitutional: Negative for chills and fever.   Gastrointestinal: Negative for abdominal pain, anal bleeding and blood in stool.   Genitourinary: Negative for dysuria and hematuria.         Current Outpatient Medications:   •  docusate sodium (Colace) 100 MG capsule, Take 1 capsule by mouth 2 (Two) Times a Day., Disp: 60 capsule, Rfl: 0  •  famotidine (PEPCID) 20 MG tablet, Take 20 mg by mouth Take As Directed. As needed, Disp: , Rfl:   •  FLUoxetine (PROzac) 10 MG capsule, Take 10 mg by mouth Daily., Disp: , Rfl:   •  FLUoxetine (PROzac) 20 MG capsule, Take 20 mg by mouth Daily., Disp: , Rfl:   •  lisinopril-hydrochlorothiazide (PRINZIDE,ZESTORETIC) 10-12.5 MG per tablet, Take 1 tablet by mouth Daily. Take 1/2 tablet daily, Disp: , Rfl:   •  naproxen sodium (Aleve) 220 MG tablet, once a week as needed for pain, Disp: , Rfl:   •  ondansetron ODT (Zofran ODT) 4 MG disintegrating tablet, Place 1 tablet on the tongue Every 6 (Six) Hours As Needed for Nausea., Disp: 10 tablet, Rfl: 1  •  sulfamethoxazole-trimethoprim (BACTRIM DS,SEPTRA DS) 800-160 MG per tablet, Take 1 tablet by mouth 2 (Two) Times a Day for 14 days., Disp: 28 tablet, Rfl: 0  •  tamsulosin (FLOMAX) 0.4 MG capsule 24 hr capsule, Take 2 capsules  "by mouth Every Night., Disp: 60 capsule, Rfl: 0  •  traMADol (ULTRAM) 50 MG tablet, Take 1 tablet by mouth Every 4 (Four) Hours As Needed for Moderate Pain  (postop pain)., Disp: 18 tablet, Rfl: 0    Past Medical History:   Diagnosis Date   • Anxiety    • Depression    • Fatty liver    • GERD (gastroesophageal reflux disease)    • Hypertension    • Kidney stones    • Osteoarthritis    • Pneumonia    • PONV (postoperative nausea and vomiting)    • Sleep apnea     CPAP MACHINE, BUT HAS NOT USED \"IN A WHILE\"       Past Surgical History:   Procedure Laterality Date   • APPENDECTOMY     • COLONOSCOPY     • ENDOSCOPY     • GALLBLADDER SURGERY     • LIVER BIOPSY     • PENILE PROSTHESIS IMPLANT N/A 8/31/2021    Procedure: 3-PIECE INFLATABLE PENILE PROSTHESIS PLACEMENT;  Surgeon: Yonatan Jaeger MD;  Location: [facility] OR;  Service: Urology;  Laterality: N/A;   • TONSILLECTOMY         Social History     Socioeconomic History   • Marital status:      Spouse name: Not on file   • Number of children: Not on file   • Years of education: Not on file   • Highest education level: Not on file   Tobacco Use   • Smoking status: Never Smoker   • Smokeless tobacco: Never Used   Vaping Use   • Vaping Use: Never used   Substance and Sexual Activity   • Alcohol use: Never   • Drug use: Never   • Sexual activity: Defer       Family History   Problem Relation Age of Onset   • Liver disease Father    • No Known Problems Mother        Objective    There were no vitals taken for this visit.    Physical Exam  Penoscrotal incision well-healed, no signs of infection.  Penile implant in appropriate position with cylinder tips at mid glans.  His implant was completely deflated today.      Results for orders placed or performed in visit on 08/30/21   COVID-19,APTIMA PANTHER,PAD IN-HOUSE,NP/OP/NASAL SWAB IN UTM/VTM/SALINE/LIQUID AMIES TRANSPORT MEDIA/NP WASH OR ASPIRATE, 24 HR TAT - Swab, Nasal Cavity    Specimen: Nasal Cavity; Swab   Result " Value Ref Range    COVID19 Not Detected Not Detected - Ref. Range   ECG 12 Lead   Result Value Ref Range    QT Interval 424 ms    QTC Interval 405 ms     Assessment and Plan    Diagnoses and all orders for this visit:    1. Postoperative visit (Primary)    Healing well to 3 piece inflatable penile implant.  He will follow-up with me next month for device teaching.      This document has been signed by ROLAN Jaeger MD on September 16, 2021 21:05 CDT

## 2021-09-15 ENCOUNTER — OFFICE VISIT (OUTPATIENT)
Dept: UROLOGY | Facility: CLINIC | Age: 66
End: 2021-09-15

## 2021-09-15 VITALS — HEIGHT: 72 IN | TEMPERATURE: 97.5 F | WEIGHT: 223 LBS | BODY MASS INDEX: 30.2 KG/M2

## 2021-09-15 DIAGNOSIS — Z48.89 POSTOPERATIVE VISIT: Primary | ICD-10-CM

## 2021-09-15 PROCEDURE — 99024 POSTOP FOLLOW-UP VISIT: CPT | Performed by: UROLOGY

## 2021-10-05 NOTE — PROGRESS NOTES
Subjective    Mr. Bennett is 65 y.o. male    Chief Complaint: Postoperative visit    History of Present Illness    65-year-old male established patient with hypertension follow-up status post Coloplast Titan 3 piece implantable penile implant on 8/31/2021.  He had some problems postoperatively with urinary hesitancy and difficulty voiding but is now voiding better off of alpha-blocker.      The following portions of the patient's history were reviewed and updated as appropriate: allergies, current medications, past family history, past medical history, past social history, past surgical history and problem list.    Review of Systems   Constitutional: Negative for chills and fever.   Gastrointestinal: Negative for abdominal pain, anal bleeding and blood in stool.   Genitourinary: Negative for dysuria and hematuria.         Current Outpatient Medications:   •  docusate sodium (Colace) 100 MG capsule, Take 1 capsule by mouth 2 (Two) Times a Day., Disp: 60 capsule, Rfl: 0  •  famotidine (PEPCID) 20 MG tablet, Take 20 mg by mouth Take As Directed. As needed, Disp: , Rfl:   •  FLUoxetine (PROzac) 10 MG capsule, Take 10 mg by mouth Daily., Disp: , Rfl:   •  FLUoxetine (PROzac) 20 MG capsule, Take 20 mg by mouth Daily., Disp: , Rfl:   •  lisinopril-hydrochlorothiazide (PRINZIDE,ZESTORETIC) 10-12.5 MG per tablet, Take 1 tablet by mouth Daily. Take 1/2 tablet daily, Disp: , Rfl:   •  naproxen sodium (Aleve) 220 MG tablet, once a week as needed for pain, Disp: , Rfl:   •  ondansetron ODT (Zofran ODT) 4 MG disintegrating tablet, Place 1 tablet on the tongue Every 6 (Six) Hours As Needed for Nausea., Disp: 10 tablet, Rfl: 1  •  tamsulosin (FLOMAX) 0.4 MG capsule 24 hr capsule, Take 2 capsules by mouth Every Night., Disp: 60 capsule, Rfl: 0  •  traMADol (ULTRAM) 50 MG tablet, Take 1 tablet by mouth Every 4 (Four) Hours As Needed for Moderate Pain  (postop pain)., Disp: 18 tablet, Rfl: 0    Past Medical History:   Diagnosis Date   •  "Anxiety    • Depression    • Fatty liver    • GERD (gastroesophageal reflux disease)    • Hypertension    • Kidney stones    • Osteoarthritis    • Pneumonia    • PONV (postoperative nausea and vomiting)    • Sleep apnea     CPAP MACHINE, BUT HAS NOT USED \"IN A WHILE\"       Past Surgical History:   Procedure Laterality Date   • APPENDECTOMY     • COLONOSCOPY     • ENDOSCOPY     • GALLBLADDER SURGERY     • LIVER BIOPSY     • PENILE PROSTHESIS IMPLANT N/A 8/31/2021    Procedure: 3-PIECE INFLATABLE PENILE PROSTHESIS PLACEMENT;  Surgeon: Yonatan Jaeger MD;  Location: [facility];  Service: Urology;  Laterality: N/A;   • TONSILLECTOMY         Social History     Socioeconomic History   • Marital status:      Spouse name: Not on file   • Number of children: Not on file   • Years of education: Not on file   • Highest education level: Not on file   Tobacco Use   • Smoking status: Never Smoker   • Smokeless tobacco: Never Used   Vaping Use   • Vaping Use: Never used   Substance and Sexual Activity   • Alcohol use: Never   • Drug use: Never   • Sexual activity: Defer       Family History   Problem Relation Age of Onset   • Liver disease Father    • No Known Problems Mother        Objective    There were no vitals taken for this visit.    Physical Exam  Penile implant in appropriate position with cylinder tips at mid glans.  His implant inflates and deflates normally.  He was instructed on device use today.      Results for orders placed or performed in visit on 08/30/21   COVID-19,APTIMA PANTHER,PAD IN-HOUSE,NP/OP/NASAL SWAB IN UTM/VTM/SALINE/LIQUID AMIES TRANSPORT MEDIA/NP WASH OR ASPIRATE, 24 HR TAT - Swab, Nasal Cavity    Specimen: Nasal Cavity; Swab   Result Value Ref Range    COVID19 Not Detected Not Detected - Ref. Range   ECG 12 Lead   Result Value Ref Range    QT Interval 424 ms    QTC Interval 405 ms     Assessment and Plan    Diagnoses and all orders for this visit:    1. Postoperative visit (Primary)    2. " Urine frequency  -     PSA DIAGNOSTIC; Future      Doing well after 3 piece inflatable penile implant.  He was instructed on device inflation and deflation today.  He would like to remain off the alpha-blocker for now.  He will follow me in 1 year with preclinic PSA or sooner as needed.      This document has been signed by ROLAN Jaeger MD on October 13, 2021 12:55 CDT

## 2021-10-11 ENCOUNTER — OFFICE VISIT (OUTPATIENT)
Dept: UROLOGY | Facility: CLINIC | Age: 66
End: 2021-10-11

## 2021-10-11 DIAGNOSIS — Z48.89 POSTOPERATIVE VISIT: Primary | ICD-10-CM

## 2021-10-11 DIAGNOSIS — R35.0 URINE FREQUENCY: ICD-10-CM

## 2021-10-11 PROCEDURE — 99024 POSTOP FOLLOW-UP VISIT: CPT | Performed by: UROLOGY

## 2021-10-12 ENCOUNTER — TELEPHONE (OUTPATIENT)
Dept: GASTROENTEROLOGY | Age: 66
End: 2021-10-12

## 2021-10-12 NOTE — TELEPHONE ENCOUNTER
Liver enzymes are normal.  AFP liver tumor marker is normal.  F/u in 1 yr for fatty liver surveillance with labwork/US prior. Thanks!

## 2022-04-20 ENCOUNTER — HOSPITAL ENCOUNTER (OUTPATIENT)
Age: 67
Setting detail: OBSERVATION
Discharge: HOME | End: 2022-04-21
Attending: HOSPITALIST
Payer: COMMERCIAL

## 2022-04-20 DIAGNOSIS — R00.2 PALPITATIONS: Primary | ICD-10-CM

## 2022-04-20 PROBLEM — R07.9 CHEST PAIN: Status: ACTIVE | Noted: 2022-04-20

## 2022-04-20 LAB
ALBUMIN SERPL-MCNC: 4.6 G/DL (ref 3.5–5.2)
ALP BLD-CCNC: 72 U/L (ref 40–130)
ALT SERPL-CCNC: 60 U/L (ref 5–41)
ANION GAP SERPL CALCULATED.3IONS-SCNC: 12 MMOL/L (ref 7–19)
AST SERPL-CCNC: 48 U/L (ref 5–40)
BASOPHILS ABSOLUTE: 0 K/UL (ref 0–0.2)
BASOPHILS RELATIVE PERCENT: 0.4 % (ref 0–1)
BILIRUB SERPL-MCNC: 0.7 MG/DL (ref 0.2–1.2)
BUN BLDV-MCNC: 17 MG/DL (ref 8–23)
CALCIUM SERPL-MCNC: 10.1 MG/DL (ref 8.8–10.2)
CHLORIDE BLD-SCNC: 103 MMOL/L (ref 98–111)
CO2: 26 MMOL/L (ref 22–29)
CREAT SERPL-MCNC: 1 MG/DL (ref 0.5–1.2)
EOSINOPHILS ABSOLUTE: 0.3 K/UL (ref 0–0.6)
EOSINOPHILS RELATIVE PERCENT: 3.6 % (ref 0–5)
GFR AFRICAN AMERICAN: >59
GFR NON-AFRICAN AMERICAN: >60
GLUCOSE BLD-MCNC: 91 MG/DL (ref 74–109)
HCT VFR BLD CALC: 51.5 % (ref 42–52)
HEMOGLOBIN: 16.9 G/DL (ref 14–18)
IMMATURE GRANULOCYTES #: 0 K/UL
LYMPHOCYTES ABSOLUTE: 1.8 K/UL (ref 1.1–4.5)
LYMPHOCYTES RELATIVE PERCENT: 25.9 % (ref 20–40)
MCH RBC QN AUTO: 28.2 PG (ref 27–31)
MCHC RBC AUTO-ENTMCNC: 32.8 G/DL (ref 33–37)
MCV RBC AUTO: 85.8 FL (ref 80–94)
MONOCYTES ABSOLUTE: 0.5 K/UL (ref 0–0.9)
MONOCYTES RELATIVE PERCENT: 7.5 % (ref 0–10)
NEUTROPHILS ABSOLUTE: 4.3 K/UL (ref 1.5–7.5)
NEUTROPHILS RELATIVE PERCENT: 62.2 % (ref 50–65)
PDW BLD-RTO: 13 % (ref 11.5–14.5)
PLATELET # BLD: 205 K/UL (ref 130–400)
PMV BLD AUTO: 9.3 FL (ref 9.4–12.4)
POTASSIUM REFLEX MAGNESIUM: 3.8 MMOL/L (ref 3.5–5)
RBC # BLD: 6 M/UL (ref 4.7–6.1)
SODIUM BLD-SCNC: 141 MMOL/L (ref 136–145)
TOTAL PROTEIN: 7.5 G/DL (ref 6.6–8.7)
TROPONIN: <0.01 NG/ML (ref 0–0.03)
TROPONIN: <0.01 NG/ML (ref 0–0.03)
WBC # BLD: 6.9 K/UL (ref 4.8–10.8)

## 2022-04-20 PROCEDURE — 85025 COMPLETE CBC W/AUTO DIFF WBC: CPT

## 2022-04-20 PROCEDURE — 84484 ASSAY OF TROPONIN QUANT: CPT

## 2022-04-20 PROCEDURE — 36415 COLL VENOUS BLD VENIPUNCTURE: CPT

## 2022-04-20 PROCEDURE — 6370000000 HC RX 637 (ALT 250 FOR IP)

## 2022-04-20 PROCEDURE — G0379 DIRECT REFER HOSPITAL OBSERV: HCPCS

## 2022-04-20 PROCEDURE — 80053 COMPREHEN METABOLIC PANEL: CPT

## 2022-04-20 PROCEDURE — G0378 HOSPITAL OBSERVATION PER HR: HCPCS

## 2022-04-20 PROCEDURE — 6360000002 HC RX W HCPCS

## 2022-04-20 PROCEDURE — 2580000003 HC RX 258

## 2022-04-20 PROCEDURE — 96372 THER/PROPH/DIAG INJ SC/IM: CPT

## 2022-04-20 RX ORDER — HYDROCHLOROTHIAZIDE 12.5 MG/1
12.5 CAPSULE, GELATIN COATED ORAL DAILY
Status: DISCONTINUED | OUTPATIENT
Start: 2022-04-21 | End: 2022-04-21 | Stop reason: HOSPADM

## 2022-04-20 RX ORDER — ONDANSETRON 2 MG/ML
4 INJECTION INTRAMUSCULAR; INTRAVENOUS EVERY 6 HOURS PRN
Status: DISCONTINUED | OUTPATIENT
Start: 2022-04-20 | End: 2022-04-21 | Stop reason: HOSPADM

## 2022-04-20 RX ORDER — FLUOXETINE HYDROCHLORIDE 20 MG/1
20 CAPSULE ORAL DAILY
Status: DISCONTINUED | OUTPATIENT
Start: 2022-04-20 | End: 2022-04-21 | Stop reason: HOSPADM

## 2022-04-20 RX ORDER — ENOXAPARIN SODIUM 100 MG/ML
1 INJECTION SUBCUTANEOUS DAILY
Status: DISCONTINUED | OUTPATIENT
Start: 2022-04-21 | End: 2022-04-20 | Stop reason: DRUGHIGH

## 2022-04-20 RX ORDER — ENOXAPARIN SODIUM 100 MG/ML
1 INJECTION SUBCUTANEOUS 2 TIMES DAILY
Status: DISCONTINUED | OUTPATIENT
Start: 2022-04-20 | End: 2022-04-21 | Stop reason: HOSPADM

## 2022-04-20 RX ORDER — SODIUM CHLORIDE 0.9 % (FLUSH) 0.9 %
5-40 SYRINGE (ML) INJECTION PRN
Status: DISCONTINUED | OUTPATIENT
Start: 2022-04-20 | End: 2022-04-21 | Stop reason: HOSPADM

## 2022-04-20 RX ORDER — ACETAMINOPHEN 650 MG/1
650 SUPPOSITORY RECTAL EVERY 6 HOURS PRN
Status: DISCONTINUED | OUTPATIENT
Start: 2022-04-20 | End: 2022-04-21 | Stop reason: HOSPADM

## 2022-04-20 RX ORDER — ASPIRIN 81 MG/1
81 TABLET, CHEWABLE ORAL DAILY
Status: DISCONTINUED | OUTPATIENT
Start: 2022-04-21 | End: 2022-04-21 | Stop reason: HOSPADM

## 2022-04-20 RX ORDER — SODIUM CHLORIDE 0.9 % (FLUSH) 0.9 %
5-40 SYRINGE (ML) INJECTION EVERY 12 HOURS SCHEDULED
Status: DISCONTINUED | OUTPATIENT
Start: 2022-04-20 | End: 2022-04-21 | Stop reason: HOSPADM

## 2022-04-20 RX ORDER — ACETAMINOPHEN 325 MG/1
650 TABLET ORAL EVERY 6 HOURS PRN
Status: DISCONTINUED | OUTPATIENT
Start: 2022-04-20 | End: 2022-04-21 | Stop reason: HOSPADM

## 2022-04-20 RX ORDER — ATORVASTATIN CALCIUM 40 MG/1
40 TABLET, FILM COATED ORAL NIGHTLY
Status: DISCONTINUED | OUTPATIENT
Start: 2022-04-20 | End: 2022-04-21 | Stop reason: HOSPADM

## 2022-04-20 RX ORDER — LISINOPRIL AND HYDROCHLOROTHIAZIDE 12.5; 1 MG/1; MG/1
1 TABLET ORAL EVERY MORNING
Status: DISCONTINUED | OUTPATIENT
Start: 2022-04-21 | End: 2022-04-20 | Stop reason: SDUPTHER

## 2022-04-20 RX ORDER — ONDANSETRON 4 MG/1
4 TABLET, ORALLY DISINTEGRATING ORAL EVERY 8 HOURS PRN
Status: DISCONTINUED | OUTPATIENT
Start: 2022-04-20 | End: 2022-04-21 | Stop reason: HOSPADM

## 2022-04-20 RX ORDER — ENOXAPARIN SODIUM 100 MG/ML
40 INJECTION SUBCUTANEOUS DAILY
Status: DISCONTINUED | OUTPATIENT
Start: 2022-04-21 | End: 2022-04-20

## 2022-04-20 RX ORDER — NITROGLYCERIN 0.4 MG/1
0.4 TABLET SUBLINGUAL EVERY 5 MIN PRN
Status: DISCONTINUED | OUTPATIENT
Start: 2022-04-20 | End: 2022-04-21 | Stop reason: HOSPADM

## 2022-04-20 RX ORDER — LISINOPRIL 10 MG/1
10 TABLET ORAL DAILY
Status: DISCONTINUED | OUTPATIENT
Start: 2022-04-21 | End: 2022-04-21 | Stop reason: HOSPADM

## 2022-04-20 RX ORDER — ENOXAPARIN SODIUM 100 MG/ML
40 INJECTION SUBCUTANEOUS DAILY
Status: DISCONTINUED | OUTPATIENT
Start: 2022-04-20 | End: 2022-04-20

## 2022-04-20 RX ORDER — POLYETHYLENE GLYCOL 3350 17 G/17G
17 POWDER, FOR SOLUTION ORAL DAILY PRN
Status: DISCONTINUED | OUTPATIENT
Start: 2022-04-20 | End: 2022-04-21 | Stop reason: HOSPADM

## 2022-04-20 RX ORDER — FAMOTIDINE 20 MG/1
20 TABLET, FILM COATED ORAL DAILY
Status: DISCONTINUED | OUTPATIENT
Start: 2022-04-20 | End: 2022-04-21 | Stop reason: HOSPADM

## 2022-04-20 RX ORDER — SODIUM CHLORIDE 9 MG/ML
INJECTION, SOLUTION INTRAVENOUS PRN
Status: DISCONTINUED | OUTPATIENT
Start: 2022-04-20 | End: 2022-04-21 | Stop reason: HOSPADM

## 2022-04-20 RX ADMIN — FLUOXETINE HYDROCHLORIDE 20 MG: 20 CAPSULE ORAL at 21:02

## 2022-04-20 RX ADMIN — FAMOTIDINE 20 MG: 20 TABLET ORAL at 21:01

## 2022-04-20 RX ADMIN — SODIUM CHLORIDE, PRESERVATIVE FREE 10 ML: 5 INJECTION INTRAVENOUS at 22:46

## 2022-04-20 RX ADMIN — ENOXAPARIN SODIUM 100 MG: 100 INJECTION SUBCUTANEOUS at 21:04

## 2022-04-20 RX ADMIN — NITROGLYCERIN 0.5 INCH: 20 OINTMENT TOPICAL at 21:02

## 2022-04-20 RX ADMIN — ATORVASTATIN CALCIUM 40 MG: 40 TABLET, FILM COATED ORAL at 21:01

## 2022-04-20 ASSESSMENT — PAIN DESCRIPTION - ORIENTATION: ORIENTATION: MID

## 2022-04-20 ASSESSMENT — PAIN DESCRIPTION - LOCATION: LOCATION: CHEST

## 2022-04-20 ASSESSMENT — ENCOUNTER SYMPTOMS
VOMITING: 0
ABDOMINAL PAIN: 0
COUGH: 0
SHORTNESS OF BREATH: 0
CHEST TIGHTNESS: 1
COLOR CHANGE: 0
ABDOMINAL DISTENTION: 0
EYES NEGATIVE: 1
CONSTIPATION: 0
WHEEZING: 0
NAUSEA: 0

## 2022-04-20 ASSESSMENT — PAIN SCALES - GENERAL: PAINLEVEL_OUTOF10: 2

## 2022-04-20 ASSESSMENT — PAIN DESCRIPTION - DESCRIPTORS: DESCRIPTORS: DISCOMFORT;PRESSURE

## 2022-04-20 NOTE — PROGRESS NOTES
Blane Evans arrived to room # [phone]. Presented with: Chest pain  Mental Status: Patient is oriented, alert, coherent and logical.   There were no vitals filed for this visit. Patient safety contract and falls prevention contract reviewed with patient Yes. Oriented Patient and Family to room. Call light within reach. Yes.   Needs, issues or concerns expressed at this time: no.      Electronically signed by Mikey Leblanc RN on 4/20/2022 at 6:08 PM

## 2022-04-20 NOTE — H&P
[facility] - History & Physical      PCP: Vivien Steiner    Date of Admission: 4/20/2022    Date of Service: 4/20/2022    Chief Complaint: Chest pain    History Of Present Illness: The patient is a 61 y.o. male who presented to [facility] ER with PMH fatty liver, ERIC, chronic back pain complaining of chest pain. Patient presents to OSH ER with intermittent chest pressure with lightheadedness. Patient states that he was awoken at 0300 due to acute onset of chest pressure. He states he was diaphoretic and lightheaded. He states that it resolved on its own however has had additional episodes that lasted longer with worsening pain, grew concerned, thus coming into OSH ER. Denies fever, nausea, vomiting, abdominal pain, and shortness of breath. Patient states that he does have a family history of heart disease and last stress test was approximately 15 years ago. Upon assessment patient states that he has had ongoing chest pressure since arrival to [facility]. V/S/S, EKG performed here no acute ST change, order to apply scheduled Nitropaste. Work-up at OSH ER CXR no active disease, WBC 6.4, Hgb 15.8, HCT 46, , K3.9, D-dimer negative, troponin negative with repeat negative, COVID-negative. Received  mg. EKG first-degree heart block 74 bpm with no ST elevation identified. Patient was transferred to [facility] direct admit for further evaluation of chest pain to rule out ACS.   Past Medical History:        Diagnosis Date    Back pain     right, flank    DDD (degenerative disc disease)     LS and t spine    Elevated ALT measurement     Elevated AST (SGOT)     Elevated liver function tests     Erectile dysfunction 2008    Fatty liver 2010    stage 1    History of gallstones     History of hematuria     Hypogonadism male 2010    Low testosterone 2010    Vitamin D deficiency 2010       Past Surgical History:        Procedure Laterality Date    APPENDECTOMY  age 17    CHOLECYSTECTOMY, LAPAROSCOPIC  11-1-09    [facility]-Dr Deejay Salgado    COLONOSCOPY  10-27-09    [facility]cum biopsy was submucosal lipoma    COLONOSCOPY  04/20/2018    DR Hugh Albright- [place]:  normal per pt    LIVER BIOPSY  11-1-09    [facility]-Dr Constantino CRUZ Doyle-fatty change with mild focal portal fibrosis    TONSILLECTOMY AND ADENOIDECTOMY  age 17    UPPER GASTROINTESTINAL ENDOSCOPY      1975??    UPPER GASTROINTESTINAL ENDOSCOPY  5/8/2014    [facility]       Home Medications:  Prior to Admission medications    Medication Sig Start Date End Date Taking? Authorizing Provider   FLUoxetine (PROZAC) 20 MG capsule Take 20 mg by mouth daily    Historical Provider, MD   FLUoxetine (PROZAC) 10 MG capsule Take 10 mg by mouth daily    Historical Provider, MD   Naproxen Sodium (ALEVE) 220 MG CAPS Take 1 tablet by mouth as needed for Pain    Historical Provider, MD   lisinopril-hydroCHLOROthiazide (PRINZIDE;ZESTORETIC) 10-12.5 MG per tablet Take 1 tablet by mouth every morning    Historical Provider, MD   famotidine (PEPCID) 20 MG tablet Take 20 mg by mouth as needed    Historical Provider, MD       Allergies:    Codeine and Lortab [hydrocodone-acetaminophen]    Social History:    The patient currently lives home  Tobacco:   reports that he has never smoked. He has never used smokeless tobacco.  Alcohol:   reports no history of alcohol use.   Illicit Drugs: Never    Family History:      Problem Relation Age of Onset    Heart Attack Father 36        premature CHD    Liver Disease Father         fatty liver    Cirrhosis Father     Cirrhosis Paternal Uncle     Liver Disease Sister     Cancer Maternal Uncle         bladder cancer    Liver Disease Paternal Uncle     Liver Disease Paternal Cousin     Colon Cancer Neg Hx     Colon Polyps Neg Hx     Esophageal Cancer Neg Hx     Liver Cancer Neg Hx     Rectal Cancer Neg Hx     Stomach Cancer Neg Hx        Review of Systems:   Review of Systems   Constitutional: Positive for diaphoresis. Negative for chills, fatigue and fever. HENT: Negative. Eyes: Negative. Respiratory: Positive for chest tightness. Negative for cough, shortness of breath and wheezing. Cardiovascular: Negative for chest pain and palpitations. Gastrointestinal: Negative for abdominal distention, abdominal pain, constipation, nausea and vomiting. Genitourinary: Negative. Musculoskeletal: Negative. Skin: Negative for color change, pallor and rash. Neurological: Positive for light-headedness. Negative for tremors, syncope, weakness, numbness and headaches. Psychiatric/Behavioral: Negative for agitation, behavioral problems and confusion. 14 point review of systems is negative except as specifically addressed above. Physical Examination:  Ht 6' 1\" (1.854 m)   Wt 223 lb (101.2 kg)   BMI 29.42 kg/m²   Physical Exam  Vitals and nursing note reviewed. Constitutional:       Appearance: Normal appearance. HENT:      Mouth/Throat:      Mouth: Mucous membranes are moist.      Pharynx: Oropharynx is clear. Eyes:      Extraocular Movements: Extraocular movements intact. Conjunctiva/sclera: Conjunctivae normal.      Pupils: Pupils are equal, round, and reactive to light. Cardiovascular:      Rate and Rhythm: Normal rate and regular rhythm. Pulses: Normal pulses. Heart sounds: Normal heart sounds. No murmur heard. Pulmonary:      Effort: Pulmonary effort is normal. No respiratory distress. Breath sounds: Normal breath sounds. Abdominal:      General: Bowel sounds are normal. There is no distension. Palpations: Abdomen is soft. Tenderness: There is no abdominal tenderness. There is no guarding or rebound. Musculoskeletal:         General: No swelling. Normal range of motion. Cervical back: Normal range of motion and neck supple. No rigidity or tenderness. Right lower leg: No edema. Left lower leg: No edema.    Skin:     General: Skin is warm and dry. Capillary Refill: Capillary refill takes less than 2 seconds. Neurological:      General: No focal deficit present. Mental Status: He is alert and oriented to person, place, and time. Cranial Nerves: No cranial nerve deficit. Psychiatric:         Mood and Affect: Mood normal.         Behavior: Behavior normal.        Diagnostic Data:  CBC:  Recent Labs     04/20/22 1747   WBC 6.9   HGB 16.9   HCT 51.5        BMP:  Recent Labs     04/20/22 1747      K 3.8      CO2 26   BUN 17   CREATININE 1.0   CALCIUM 10.1     Recent Labs     04/20/22 1747   AST 48*   ALT 60*   BILITOT 0.7   ALKPHOS 72       Assessment/Plan:    Chest pain   - Aspirin and Lipitor  -Scheduled Nitropaste  - ECHO   - Trend troponin  - FLP in a.m.  - Serial and PRN EKGs  - Monitor on telemetry  - NPO after midnight   -Stress test in a.m.      DVT prophylaxis Lovenox    Signed:  SHELBIE Reyes - CNP, 4/20/2022 5:33 PM

## 2022-04-21 ENCOUNTER — APPOINTMENT (OUTPATIENT)
Dept: NUCLEAR MEDICINE | Age: 67
End: 2022-04-21
Attending: HOSPITALIST
Payer: COMMERCIAL

## 2022-04-21 VITALS
HEART RATE: 67 BPM | WEIGHT: 218.6 LBS | RESPIRATION RATE: 20 BRPM | DIASTOLIC BLOOD PRESSURE: 77 MMHG | TEMPERATURE: 97.2 F | BODY MASS INDEX: 28.97 KG/M2 | OXYGEN SATURATION: 95 % | SYSTOLIC BLOOD PRESSURE: 128 MMHG | HEIGHT: 73 IN

## 2022-04-21 LAB
CHOLESTEROL, TOTAL: 158 MG/DL (ref 160–199)
EKG P AXIS: 45 DEGREES
EKG P-R INTERVAL: 224 MS
EKG Q-T INTERVAL: 388 MS
EKG QRS DURATION: 92 MS
EKG QTC CALCULATION (BAZETT): 417 MS
EKG T AXIS: 48 DEGREES
HDLC SERPL-MCNC: 33 MG/DL (ref 55–121)
LDL CHOLESTEROL CALCULATED: 77 MG/DL
LV EF: 60 %
LV EF: 68 %
LVEF MODALITY: NORMAL
LVEF MODALITY: NORMAL
TRIGL SERPL-MCNC: 241 MG/DL (ref 0–149)
TROPONIN: <0.01 NG/ML (ref 0–0.03)

## 2022-04-21 PROCEDURE — 84484 ASSAY OF TROPONIN QUANT: CPT

## 2022-04-21 PROCEDURE — 93246 EXT ECG>7D<15D RECORDING: CPT

## 2022-04-21 PROCEDURE — 3430000000 HC RX DIAGNOSTIC RADIOPHARMACEUTICAL: Performed by: STUDENT IN AN ORGANIZED HEALTH CARE EDUCATION/TRAINING PROGRAM

## 2022-04-21 PROCEDURE — G0378 HOSPITAL OBSERVATION PER HR: HCPCS

## 2022-04-21 PROCEDURE — 93248 EXT ECG>7D<15D REV&INTERPJ: CPT | Performed by: INTERNAL MEDICINE

## 2022-04-21 PROCEDURE — 80061 LIPID PANEL: CPT

## 2022-04-21 PROCEDURE — 6370000000 HC RX 637 (ALT 250 FOR IP)

## 2022-04-21 PROCEDURE — 93005 ELECTROCARDIOGRAM TRACING: CPT

## 2022-04-21 PROCEDURE — 93017 CV STRESS TEST TRACING ONLY: CPT

## 2022-04-21 PROCEDURE — 6360000002 HC RX W HCPCS: Performed by: STUDENT IN AN ORGANIZED HEALTH CARE EDUCATION/TRAINING PROGRAM

## 2022-04-21 PROCEDURE — A9500 TC99M SESTAMIBI: HCPCS | Performed by: STUDENT IN AN ORGANIZED HEALTH CARE EDUCATION/TRAINING PROGRAM

## 2022-04-21 PROCEDURE — 36415 COLL VENOUS BLD VENIPUNCTURE: CPT

## 2022-04-21 PROCEDURE — C8929 TTE W OR WO FOL WCON,DOPPLER: HCPCS

## 2022-04-21 PROCEDURE — 6360000004 HC RX CONTRAST MEDICATION: Performed by: STUDENT IN AN ORGANIZED HEALTH CARE EDUCATION/TRAINING PROGRAM

## 2022-04-21 RX ORDER — ATORVASTATIN CALCIUM 40 MG/1
40 TABLET, FILM COATED ORAL NIGHTLY
Qty: 30 TABLET | Refills: 0 | Status: SHIPPED | OUTPATIENT
Start: 2022-04-21 | End: 2022-05-19

## 2022-04-21 RX ADMIN — NITROGLYCERIN 0.5 INCH: 20 OINTMENT TOPICAL at 06:12

## 2022-04-21 RX ADMIN — TETRAKIS(2-METHOXYISOBUTYLISOCYANIDE)COPPER(I) TETRAFLUOROBORATE 30 MILLICURIE: 1 INJECTION, POWDER, LYOPHILIZED, FOR SOLUTION INTRAVENOUS at 09:45

## 2022-04-21 RX ADMIN — TETRAKIS(2-METHOXYISOBUTYLISOCYANIDE)COPPER(I) TETRAFLUOROBORATE 10 MILLICURIE: 1 INJECTION, POWDER, LYOPHILIZED, FOR SOLUTION INTRAVENOUS at 09:45

## 2022-04-21 RX ADMIN — ACETAMINOPHEN 650 MG: 325 TABLET ORAL at 00:25

## 2022-04-21 RX ADMIN — REGADENOSON 0.4 MG: 0.08 INJECTION, SOLUTION INTRAVENOUS at 08:46

## 2022-04-21 RX ADMIN — PERFLUTREN 1.65 MG: 6.52 INJECTION, SUSPENSION INTRAVENOUS at 07:53

## 2022-04-21 RX ADMIN — NITROGLYCERIN 0.5 INCH: 20 OINTMENT TOPICAL at 00:20

## 2022-04-21 ASSESSMENT — PAIN DESCRIPTION - DESCRIPTORS: DESCRIPTORS: DULL

## 2022-04-21 ASSESSMENT — PAIN SCALES - GENERAL
PAINLEVEL_OUTOF10: 0
PAINLEVEL_OUTOF10: 0
PAINLEVEL_OUTOF10: 3

## 2022-04-21 NOTE — DISCHARGE SUMMARY
Discharge Summary    NAME: Serena Fritz  :  1955  MRN:  [id number]    Admit date:  2022  Discharge date:  2022    Advance Directive: Full Code      Primary Care Physician:  Attila Jones    Discharge Diagnoses:  Principal Problem:    Chest pain  Active Problems:    GERD (gastroesophageal reflux disease)    Hepatic steatosis    Fatty liver         Significant Diagnostic Studies:   No results found. Pertinent Labs:   CBC: Recent Labs     22  1747   WBC 6.9   HGB 16.9        BMP:    Recent Labs     22  1747      K 3.8      CO2 26   BUN 17   CREATININE 1.0   GLUCOSE 91     INR: No results for input(s): INR in the last 72 hours. Lipids:   Recent Labs     22  0222   CHOL 158*   HDL 33*             Hospital Course:   37-year-old male with chronic back pain, fatty liver disease, sleep apnea presented with chest pain described as intermittent pressure with associated lightheadedness/dizziness but no shortness of breath, diaphoresis, nausea, vomiting or abdominal pain. He did describe some occasional palpitations. Not associated with exertion. No reproducible chest wall tenderness. EKG without ischemic changes. Monitored without any events on telemetry, remained in sinus rhythm and no frequent PVCs. Serial troponins were negative. He underwent Lexiscan stress test with normal perfusion study. Echo obtained showed preserved EF, no wall motion abnormalities. Medically stable for discharge home, set up with Zio patch for outpatient cardiac monitoring with outpatient cardiology follow-up arranged in approximately 3 weeks. Instructed to follow-up with PCP within 1 week. Added atorvastatin to medication regimen. Physical Exam:  Vital Signs: /80   Pulse 90   Temp 96.6 °F (35.9 °C) (Temporal)   Resp 18   Ht 6' 1\" (1.854 m)   Wt 218 lb 9.6 oz (99.2 kg)   SpO2 97%   BMI 28.84 kg/m²   General appearance:. Alert and Cooperative   HEENT: Normocephalic. Atraumatic  Chest: clear to auscultation bilaterally without wheezes or rhonchi. Cardiac: Normal heart tones with regular rate and rhythm, S1, S2 normal.   Abdomen:soft, non-tender; normal bowel sounds  Extremities: No clubbing or cyanosis. No peripheral edema. Peripheral pulses palpable. Neurologic: Grossly intact. Discharge Medications:         Medication List      START taking these medications    atorvastatin 40 MG tablet  Commonly known as: LIPITOR  Take 1 tablet by mouth nightly        CHANGE how you take these medications    PROzac 20 MG capsule  Generic drug: FLUoxetine  What changed: Another medication with the same name was removed. Continue taking this medication, and follow the directions you see here. CONTINUE taking these medications    Aleve 220 MG Caps  Generic drug: Naproxen Sodium     famotidine 20 MG tablet  Commonly known as: PEPCID     lisinopril-hydroCHLOROthiazide 10-12.5 MG per tablet  Commonly known as: PRINZIDE;ZESTORETIC           Where to Get Your Medications      These medications were sent to [address] [place] [address] [phone] [facility] [phone]  [address], [address] [facility]    Phone: [phone]   · atorvastatin 40 MG tablet         Discharge Instructions: Follow up with Ignacia Brown within 1 week. Take medications as directed. Resume activity as tolerated. Disposition: Patient is medically stable and will be discharged home. Time spent on discharge 35 minutes.     Signed:  Julia Maldonado MD  4/21/2022 8:00 AM

## 2022-04-21 NOTE — PLAN OF CARE
Problem: Discharge Planning  Goal: Discharge to home or other facility with appropriate resources  Outcome: Progressing     Problem: ABCDS Injury Assessment  Goal: Absence of physical injury  Outcome: Progressing   Electronically signed by Tamar Mejia RN on 4/21/2022 at 3:11 AM

## 2022-04-27 LAB
EKG P AXIS: 21 DEGREES
EKG P-R INTERVAL: 228 MS
EKG Q-T INTERVAL: 414 MS
EKG QRS DURATION: 104 MS
EKG QTC CALCULATION (BAZETT): 419 MS
EKG T AXIS: 8 DEGREES

## 2022-05-04 PROCEDURE — 93246 EXT ECG>7D<15D RECORDING: CPT | Performed by: INTERNAL MEDICINE

## 2022-05-13 NOTE — PROCEDURES
Cardiac event monitor report    Dates of recording 4/21/2022 through 5/4/2022    Summary impressions:    1. Sinus rhythm minimal heart rate 49 average 76 maximal 134    2. 2 episode supraventricular tachycardia fastest interval 10 beats at a maximal rate of 130 longest 13 beats average rate 104    3. No episodes of ventricular tachycardia were recorded    4. No pauses greater than 3.0 seconds were recorded    5. No episodes of complete or Mobitz 2 atrioventricular block were recorded    6. No episodes of atrial fibrillation were recorded    7. 136 triggered events 14 diary events rhythm during those recording sinus rhythm PACs and PVCs    8.  Rare ectopy otherwise noted

## 2022-05-19 ENCOUNTER — OFFICE VISIT (OUTPATIENT)
Dept: CARDIOLOGY CLINIC | Age: 67
End: 2022-05-19
Payer: COMMERCIAL

## 2022-05-19 VITALS
WEIGHT: 219 LBS | SYSTOLIC BLOOD PRESSURE: 138 MMHG | HEART RATE: 76 BPM | HEIGHT: 73 IN | BODY MASS INDEX: 29.03 KG/M2 | DIASTOLIC BLOOD PRESSURE: 88 MMHG | OXYGEN SATURATION: 95 %

## 2022-05-19 DIAGNOSIS — I10 ESSENTIAL HYPERTENSION: ICD-10-CM

## 2022-05-19 DIAGNOSIS — R00.2 PALPITATIONS: Primary | ICD-10-CM

## 2022-05-19 PROCEDURE — 93000 ELECTROCARDIOGRAM COMPLETE: CPT | Performed by: NURSE PRACTITIONER

## 2022-05-19 PROCEDURE — 99214 OFFICE O/P EST MOD 30 MIN: CPT | Performed by: NURSE PRACTITIONER

## 2022-05-19 RX ORDER — FLUOXETINE 10 MG/1
10 CAPSULE ORAL DAILY
COMMUNITY

## 2022-05-19 ASSESSMENT — ENCOUNTER SYMPTOMS
SORE THROAT: 0
COUGH: 0
WHEEZING: 0
SHORTNESS OF BREATH: 0
CHEST TIGHTNESS: 0

## 2022-05-19 NOTE — PROGRESS NOTES
[facility]  [address] [facility]  [phone]      Chief Complaint / Reason for Being Seen: Hospital follow-up. 1. Palpitations    2. Essential hypertension      Patient with a history of chronic back pain, fatty liver disease, sleep apnea. History of coronary artery disease which includes mom and dad. Patient had a recent hospitalization on 4/20/2022. He presented with complaints of chest pain described as intermittent pressure that was associated with lightheadedness and dizziness. There was no shortness of breath. Work-up in included EKG without ischemic changes. Telemetry showed sinus rhythm with no frequent PVCs. Serial troponins negative. Lexiscan normal perfusion study. 2D echo showed preserved EF with no wall motion abnormalities. Zio patch at discharge. Lipitor was added at discharge. Zio patch showed:    1. Sinus rhythm minimal heart rate 49 average 76 maximal 134     2.         2 episode supraventricular tachycardia fastest interval 10 beats at a maximal rate of 130 longest 13 beats average rate 104     3. No episodes of ventricular tachycardia were recorded     4. No pauses greater than 3.0 seconds were recorded     5. No episodes of complete or Mobitz 2 atrioventricular block were recorded     6. No episodes of atrial fibrillation were recorded     7.         136 triggered events 14 diary events rhythm during those recording sinus rhythm PACs and PVCs     8.         Rare ectopy otherwise noted        Subjective: Presents to clinic today for follow-up. Patient denies any chest pain, pressure or tightness. There is no shortness of breath, orthopnea or PND. Patient denies any lightheadedness, dizziness or syncope. Old records have been obtained from the referring providers. Those records have been reviewed and summarized.       Demetri Shaffer is a 61 y.o. male with the following history as recorded in [facility]:  Patient Active Problem List    Diagnosis Date Noted    Chest pain 04/20/2022    Fatty liver determined by biopsy 07/27/2021    Overweight (BMI 25.0-29.9) 07/27/2021    Hepatic steatosis 04/25/2017    SALDAÑA (nonalcoholic steatohepatitis) 04/22/2014    Elevated liver enzymes 04/22/2014    Family history of cirrhosis of liver 04/22/2014    GERD (gastroesophageal reflux disease) 04/22/2014     Current Outpatient Medications   Medication Sig Dispense Refill    FLUoxetine (PROZAC) 10 MG capsule Take 10 mg by mouth daily      FLUoxetine (PROZAC) 20 MG capsule Take 20 mg by mouth daily      Naproxen Sodium (ALEVE) 220 MG CAPS Take 1 tablet by mouth as needed for Pain      lisinopril-hydroCHLOROthiazide (PRINZIDE;ZESTORETIC) 10-12.5 MG per tablet Take 1 tablet by mouth every morning      famotidine (PEPCID) 20 MG tablet Take 20 mg by mouth as needed       No current facility-administered medications for this visit.      Allergies: Codeine and Lortab [hydrocodone-acetaminophen]  Past Medical History:   Diagnosis Date    Back pain     right, flank    DDD (degenerative disc disease)     LS and t spine    Elevated ALT measurement     Elevated AST (SGOT)     Elevated liver function tests     Erectile dysfunction 2008    Fatty liver 2010    stage 1    GERD (gastroesophageal reflux disease)     History of gallstones     History of hematuria     Hypertension     Hypogonadism male 2010    Low testosterone 2010    Vitamin D deficiency 2010     Past Surgical History:   Procedure Laterality Date    APPENDECTOMY  age 14    CHOLECYSTECTOMY, LAPAROSCOPIC  11-1-09    [facility]-Dr Jerry Nguyen    COLONOSCOPY  10-27-09    [place]-cecum biopsy was submucosal lipoma    COLONOSCOPY  04/20/2018    [place] Angelia [place]  normal per [place]    LIVER BIOPSY  11-1-09    [place]-Dr Rodrigo Doyle-fatty change with mild focal portal fibrosis    TONSILLECTOMY AND ADENOIDECTOMY  age 17    UPPER GASTROINTESTINAL ENDOSCOPY      1975??    UPPER GASTROINTESTINAL ENDOSCOPY  5/8/2014    [facility]     Family History   Problem Relation Age of Onset    Heart Attack Father 36        premature CHD    Liver Disease Father         fatty liver    Cirrhosis Father     Cirrhosis Paternal Uncle     Liver Disease Sister     Cancer Maternal Uncle         bladder cancer    Liver Disease Paternal Uncle     Liver Disease Paternal Cousin     Colon Cancer Neg Hx     Colon Polyps Neg Hx     Esophageal Cancer Neg Hx     Liver Cancer Neg Hx     Rectal Cancer Neg Hx     Stomach Cancer Neg Hx      Social History     Tobacco Use    Smoking status: Never Smoker    Smokeless tobacco: Never Used   Substance Use Topics    Alcohol use: No          Review of System:    Review of Systems   Constitutional: Negative for activity change, chills, diaphoresis, fatigue and fever. HENT: Negative for congestion and sore throat. Respiratory: Negative for cough, chest tightness, shortness of breath and wheezing. Cardiovascular: Negative for chest pain, palpitations and leg swelling. Neurological: Negative for dizziness, syncope, light-headedness and headaches. Psychiatric/Behavioral: Negative for confusion. The patient is not nervous/anxious. Objective:    /88   Pulse 76   Ht 6' 1\" (1.854 m)   Wt 219 lb (99.3 kg)   SpO2 95%   BMI 28.89 kg/m²     GENERAL - well developed and well nourished    HEENT   PERRLA, Hearing appears normal, conjunctive and lids are normal.  External inspection of ears and nose appear normal.  NECK - no thyromegaly, no JVD, trachea is in the midline  CARDIOVASCULAR  PMI is in the mid line clavicular position, Normal S1 and S2 with no systolic murmur. No S3 or S4    PULMONARY  no respiratory distress. No wheezes or rales. Lungs are clear to ausculation, normal respiratory effort.   ABDOMEN   soft, non tender, no rebound  MUSCULOSKELETAL   range of motion of the upper and lower extermites appears normal and equal and is without pain   EXTREMITIES - no significant edema   NEUROLOGIC  gait and station are normal  SKIN - turgor is normal, skin warm and dry. PSYCHIATRIC - normal mood and affect, alert and orientated x 3,      ASSESSMENT:    ALL THE CARDIOLOGY PROBLEMS ARE LISTED ABOVE; HOWEVER, THE FOLLOWING SPECIFIC CARDIAC PROBLEMS / CONDITIONS WERE ADDRESSED AND TREATED DURING THE OFFICE VISIT TODAY:       Cardiac Specific Problem  Discussion and Plan         1. Palpitations  initial encounter   Occasional rare episode not problematic. EKG in the office today showing sinus rhythm with a heart rate of 76 bpm.         2.   Hypertension  initial encounter   Blood pressure in the office today 138/88. O2 sat 95%. Patient is on Zestoretic 1 tablet daily. New present medical management                   PLAN:    Orders Placed This Encounter   Procedures    EKG 12 lead     Order Specific Question:   Reason for Exam?     Answer: Other     No orders of the defined types were placed in this encounter. Return in about 6 months (around 11/19/2022) for Follow ,up . Discussed with patient. I greatly appreciate the opportunity to meet Alejandra Ponce and your confidence in allowing me to participate in his cardiovascular care. SHELBIE Boykin NP 5/19/2022 9:47 AM CDT                    This dictation was generated by voice recognition computer software. Although all attempts are made to edit dictation for accuracy, there may be errors in the transcription that are not intended.

## 2022-09-28 ENCOUNTER — TRANSCRIBE ORDERS (OUTPATIENT)
Dept: ADMINISTRATIVE | Facility: HOSPITAL | Age: 67
End: 2022-09-28

## 2022-09-28 ENCOUNTER — TELEPHONE (OUTPATIENT)
Dept: GASTROENTEROLOGY | Age: 67
End: 2022-09-28

## 2022-09-28 DIAGNOSIS — K76.0 FATTY LIVER DETERMINED BY BIOPSY: Primary | ICD-10-CM

## 2022-09-28 DIAGNOSIS — K76.89 HEPATIC CYST: ICD-10-CM

## 2022-09-28 NOTE — TELEPHONE ENCOUNTER
PT HAS BEEN SCHEDULED AT [facility] ON 10-4-22 @ 9:30AM WITH ARRIVAL OF 9AM.    NPO AFTER MIDNIGHT. ORDERS HAVE BEEN FAXED [phone]  FAX CONFIRMATION SCANNED IN MEDIA AND ATTACHED TO THIS ENCOUNTER.       PT HAS BEEN NOTIFIED OF THIS AND VV SCHEDULED TO SEE [address], [address] ON 10-18-22

## 2022-09-28 NOTE — TELEPHONE ENCOUNTER
I reviewed the labs you scanned in. No need to repeat them, they are current enough to use. All he needs is a liver ultrasound prior to his appt, order for this placed.   thanks

## 2022-09-28 NOTE — TELEPHONE ENCOUNTER
[place], it is time for Bryce to sched his F/u in 1 yr for fatty liver surveillance with labwork/US prior. I have scanned in some labs and routing them to you. Please review and put in orders that is needed for him to do and I will call pt. Thanks.

## 2022-10-04 ENCOUNTER — HOSPITAL ENCOUNTER (OUTPATIENT)
Dept: ULTRASOUND IMAGING | Facility: HOSPITAL | Age: 67
Discharge: HOME | End: 2022-10-04
Admitting: NURSE PRACTITIONER

## 2022-10-04 DIAGNOSIS — K76.0 FATTY LIVER DETERMINED BY BIOPSY: ICD-10-CM

## 2022-10-04 PROCEDURE — 76705 ECHO EXAM OF ABDOMEN: CPT

## 2022-10-04 NOTE — TELEPHONE ENCOUNTER
I reviewed the liver US he just had done, I want to get an AFP prior to his appt please, order placed.

## 2022-10-05 ENCOUNTER — TELEPHONE (OUTPATIENT)
Dept: GASTROENTEROLOGY | Age: 67
End: 2022-10-05

## 2022-10-12 DIAGNOSIS — N52.01 ERECTILE DYSFUNCTION DUE TO ARTERIAL INSUFFICIENCY: Primary | ICD-10-CM

## 2022-10-18 ENCOUNTER — TELEPHONE (OUTPATIENT)
Dept: UROLOGY | Facility: CLINIC | Age: 67
End: 2022-10-18

## 2022-10-31 ENCOUNTER — TELEMEDICINE (OUTPATIENT)
Dept: GASTROENTEROLOGY | Age: 67
End: 2022-10-31
Payer: COMMERCIAL

## 2022-10-31 DIAGNOSIS — K76.89 HEPATIC CYST: ICD-10-CM

## 2022-10-31 DIAGNOSIS — K76.0 FATTY LIVER DETERMINED BY BIOPSY: Primary | ICD-10-CM

## 2022-10-31 PROCEDURE — 99213 OFFICE O/P EST LOW 20 MIN: CPT | Performed by: NURSE PRACTITIONER

## 2022-10-31 ASSESSMENT — ENCOUNTER SYMPTOMS
VOMITING: 0
BLOOD IN STOOL: 0
NAUSEA: 0
BACK PAIN: 0
COUGH: 0
CONSTIPATION: 0
TROUBLE SWALLOWING: 0
RECTAL PAIN: 0
SHORTNESS OF BREATH: 0
DIARRHEA: 0
ANAL BLEEDING: 0
ABDOMINAL PAIN: 0
ABDOMINAL DISTENTION: 0
VOICE CHANGE: 0

## 2022-10-31 NOTE — PROGRESS NOTES
10/31/2022    TELEHEALTH EVALUATION -- Audio/Visual (During WCFPL-89 public health emergency)    HPI:    Oracio Feldman (:  1955) has requested an audio/video evaluation for the following concern(s):    Pt made an annual appt  For fatty liver surveillance  Recent RUQ US in epic, it reveals a fatty liver as well as 2 hepatic cysts (no real change from imaging in ). AFP is normal. It also notes an echogenic pancreas, pt has no hx of DM  Recent labs scanned in media notes ALT 73 (<50), normal AST, normal total bili, normal alk phos, normal platelet count, lipids normal.  BMI is 28     He has no GI complaints. Colonoscopy is current. Review of Systems   Constitutional:  Negative for fatigue and unexpected weight change. HENT:  Negative for trouble swallowing and voice change. Respiratory:  Negative for cough and shortness of breath. Cardiovascular:  Negative for chest pain and palpitations. Gastrointestinal:  Negative for abdominal distention, abdominal pain, anal bleeding, blood in stool, constipation, diarrhea, nausea, rectal pain and vomiting. Genitourinary:  Negative for hematuria. Musculoskeletal:  Negative for arthralgias, back pain and neck pain. Neurological:  Negative for weakness and headaches. Psychiatric/Behavioral:  Negative for dysphoric mood. The patient is not nervous/anxious. Prior to Visit Medications    Medication Sig Taking?  Authorizing Provider   FLUoxetine (PROZAC) 10 MG capsule Take 10 mg by mouth daily  Historical Provider, MD   FLUoxetine (PROZAC) 20 MG capsule Take 20 mg by mouth daily  Historical Provider, MD   Naproxen Sodium (ALEVE) 220 MG CAPS Take 1 tablet by mouth as needed for Pain  Historical Provider, MD   lisinopril-hydroCHLOROthiazide (PRINZIDE;ZESTORETIC) 10-12.5 MG per tablet Take 1 tablet by mouth every morning  Historical Provider, MD   famotidine (PEPCID) 20 MG tablet Take 20 mg by mouth as needed  Historical Provider, MD       Social History     Tobacco Use    Smoking status: Never    Smokeless tobacco: Never   Vaping Use    Vaping Use: Never used   Substance Use Topics    Alcohol use: No    Drug use: No          PHYSICAL EXAMINATION:  [ INSTRUCTIONS:  \"[x]\" Indicates a positive item  \"[]\" Indicates a negative item  -- DELETE ALL ITEMS NOT EXAMINED]  Vital Signs: (As obtained by patient/caregiver or practitioner observation)    Blood pressure-  Heart rate-    Respiratory rate-    Temperature-  Pulse oximetry-     Constitutional: [x] Appears well-developed and well-nourished [x] No apparent distress      [] Abnormal-   Mental status  [x] Alert and awake  [x] Oriented to person/place/time [x]Able to follow commands      Eyes:  EOM    [x]  Normal  [] Abnormal-  Sclera  [x]  Normal  [] Abnormal -         Discharge [x]  None visible  [] Abnormal -    HENT:   [x] Normocephalic, atraumatic. [] Abnormal   [x] Mouth/Throat: Mucous membranes are moist.     External Ears [x] Normal  [] Abnormal-     Neck: [x] No visualized mass     Pulmonary/Chest: [x] Respiratory effort normal.  [x] No visualized signs of difficulty breathing or respiratory distress        [] Abnormal-      Musculoskeletal:   [] Normal gait with no signs of ataxia         [x] Normal range of motion of neck        [] Abnormal-       Neurological:        [] No Facial Asymmetry (Cranial nerve 7 motor function) (limited exam to video visit)          [x] No gaze palsy        [] Abnormal-         Skin:        [x] No significant exanthematous lesions or discoloration noted on facial skin         [] Abnormal-            Psychiatric:       [x] Normal Affect [x] No Hallucinations        [] Abnormal-     Other pertinent observable physical exam findings-     ASSESSMENT/PLAN:  F/u in 1 yr for fatty liver with labs/US prior  Work on wt gradual weight loss to get to goal BMI  Low saturated fat diet      1. Fatty liver determined by biopsy    2.  Hepatic cyst        Amanda Rollins, was evaluated through a synchronous (real-time) audio-video encounter. The patient (or guardian if applicable) is aware that this is a billable service, which includes applicable co-pays. This Virtual Visit was conducted with patient's (and/or legal guardian's) consent. The visit was conducted pursuant to the emergency declaration under the [address] [place] [address], [address] [place] [address] authority and the [employer] and [employer] General Act. Patient identification was verified, and a caregiver was present when appropriate. The patient was located at Home: [address] [place] [address]. Provider was located at [place] [address]): [address] [place] [address] [facility]  Phyllis Dunlap . Total time spent on this encounter: Not billed by time    --SHELBIE Godoy on 10/31/2022 at 1:30 PM    An electronic signature was used to authenticate this note.

## 2022-10-31 NOTE — PATIENT INSTRUCTIONS
Recommendations for Fatty Liver disease    Here are a few modifications you can make to your diet and lifestyle:  Consume fiber rich foods as much as possible. Eat until you are no longer hungry but not until you are full. Increase the frequency of your consumption of white meats. Eat fresh fruits. Eat green vegetables daily in addition to other vegetables. Regularly eat foods like brown rice because they have complex carbohydrates which are good for you. Avoiding some foods and beverages can help reduce the progression of fatty liver disease. Here are some of the things you should avoid:  alcoholic beverages   soda, fruit juice that is not 100% fruit juice and all other high sugar beverages   dark meats   fried foods   simple carb rich foods such as candy, and sweets   saturated fats should be avoided whenever possible     A fatty liver diet plan needs to be strict in order to reduce the possibility of further damage to the liver and other organs. The sacrifices as far as food and beverages are concerned are minimal when you consider the risks if these sacrifices are not made. Recommendation for consultation with dietician may be made and is a very important part of your care. In addition to eating right, exercising regularly and gradual weight reduction will increase your overall health as well as improve your livers health. If you are overweight you should lose 10% of your total body weight.     Dietary supplements recommended:  Vitamin E 800 IU

## 2022-11-02 ENCOUNTER — OFFICE VISIT (OUTPATIENT)
Dept: UROLOGY | Facility: CLINIC | Age: 67
End: 2022-11-02

## 2022-11-02 VITALS — TEMPERATURE: 96.8 F | HEIGHT: 73 IN | BODY MASS INDEX: 29.63 KG/M2 | WEIGHT: 223.6 LBS

## 2022-11-02 DIAGNOSIS — R35.0 URINE FREQUENCY: Primary | ICD-10-CM

## 2022-11-02 LAB
BILIRUB BLD-MCNC: NEGATIVE MG/DL
CLARITY, POC: CLEAR
COLOR UR: YELLOW
GLUCOSE UR STRIP-MCNC: NEGATIVE MG/DL
KETONES UR QL: NEGATIVE
LEUKOCYTE EST, POC: NEGATIVE
NITRITE UR-MCNC: NEGATIVE MG/ML
PH UR: 6 [PH] (ref 5–8)
PROT UR STRIP-MCNC: NEGATIVE MG/DL
RBC # UR STRIP: NEGATIVE /UL
SP GR UR: 1.02 (ref 1–1.03)
UROBILINOGEN UR QL: NORMAL

## 2022-11-02 PROCEDURE — 99212 OFFICE O/P EST SF 10 MIN: CPT | Performed by: UROLOGY

## 2022-11-02 PROCEDURE — 81001 URINALYSIS AUTO W/SCOPE: CPT | Performed by: UROLOGY

## 2022-11-02 RX ORDER — CETIRIZINE HYDROCHLORIDE 5 MG/1
5 TABLET ORAL DAILY
COMMUNITY
End: 2023-03-15

## 2022-11-02 RX ORDER — OMEPRAZOLE 20 MG/1
CAPSULE, DELAYED RELEASE ORAL
COMMUNITY
Start: 2022-09-15

## 2022-11-02 RX ORDER — LOSARTAN POTASSIUM 25 MG/1
TABLET ORAL
COMMUNITY
Start: 2022-10-10

## 2022-11-02 RX ORDER — MONTELUKAST SODIUM 10 MG/1
TABLET ORAL
COMMUNITY
Start: 2022-10-10

## 2023-03-14 NOTE — H&P (VIEW-ONLY)
"YOB: 1955  Location: [facility]  Location Address: [address] [place] [address], [facility], [address] [place] [address]  Location Phone: [phone]    Chief Complaint   Patient presents with   • Sleep Apnea       History of Present Illness  Contreras Bennett is a 67 y.o. male.  Contreras Bennett is here for evaluation of ENT complaints. The patient has had problems with sleep apnea, snoring, and daytime fatigue.  The symptoms are not localized to a particular location. The patient has had mild to moderate symptoms. The symptoms have been present for the last several years. There have been no identified factors that aggravate the symptoms. There have been no factors that have improved the symptoms.    He has not had a recent sleep study. He has tried several CPAP masks and is unable to tolerate these well. He is interested in the inspire today.    [place]: 14  Past Medical History:   Diagnosis Date   • Allergic rhinitis Years ago   • Anxiety    • Depression    • Fatty liver    • GERD (gastroesophageal reflux disease)    • Hypertension    • Kidney stones    • Osteoarthritis    • Pneumonia    • PONV (postoperative nausea and vomiting)    • Sleep apnea     CPAP MACHINE, BUT HAS NOT USED \"IN A WHILE\"       Past Surgical History:   Procedure Laterality Date   • APPENDECTOMY     • COLONOSCOPY     • ENDOSCOPY     • GALLBLADDER SURGERY     • LIVER BIOPSY     • PENILE PROSTHESIS IMPLANT N/A 2021    Procedure: 3-PIECE INFLATABLE PENILE PROSTHESIS PLACEMENT;  Surgeon: Yonatan Jaeger MD;  Location: [facility];  Service: Urology;  Laterality: N/A;   • TONSILLECTOMY         Outpatient Medications Marked as Taking for the 3/15/23 encounter (Office Visit) with Adan Radford APRN   Medication Sig Dispense Refill   • cetirizine (zyrTEC) 10 MG tablet Take 1 tablet by mouth Daily.     • losartan (COZAAR) 25 MG tablet      • montelukast (SINGULAIR) 10 MG tablet      • omeprazole (priLOSEC) 20 MG capsule          Codeine and " Hydrocodone    Family History   Problem Relation Age of Onset   • Liver disease Father    • Diabetes Mother    • Hypertension Mother        Social History     Socioeconomic History   • Marital status:    Tobacco Use   • Smoking status: Never   • Smokeless tobacco: Never   Vaping Use   • Vaping Use: Never used   Substance and Sexual Activity   • Alcohol use: Never   • Drug use: Never   • Sexual activity: Defer       Review of Systems   Constitutional: Positive for fatigue.   HENT:        Admits snoring   Eyes: Negative.    Respiratory: Positive for apnea.    Cardiovascular: Negative.    Gastrointestinal: Negative.    Genitourinary: Negative.    Musculoskeletal: Negative.    Neurological: Negative.        Vitals:    03/15/23 1021   BP: 163/90   Pulse: 73   Resp: 16   Temp: 98 °F (36.7 °C)       Body mass index is 29.95 kg/m².    Objective     Physical Exam  Vitals reviewed.   Constitutional:       Appearance: Normal appearance.   HENT:      Head: Normocephalic and atraumatic.      Right Ear: Hearing and external ear normal.      Left Ear: Hearing and external ear normal.      Nose: Nose normal.      Mouth/Throat:      Lips: Pink.      Mouth: Mucous membranes are moist.      Pharynx: Uvula midline.      Comments: Brandon II/III  Musculoskeletal:      Cervical back: Full passive range of motion without pain.   Neurological:      Mental Status: He is alert.   Psychiatric:         Behavior: Behavior is cooperative.         Assessment & Plan   Diagnoses and all orders for this visit:    1. DAYRON (obstructive sleep apnea) (Primary)  -     Home Sleep Study; Future  -     Case Request; Standing  -     Basic Metabolic Panel; Future  -     CBC (No Diff); Future  -     ECG 12 Lead; Future  -     XR Chest 1 View; Future  -     Case Request    2. Snoring  -     Home Sleep Study; Future  -     Case Request; Standing  -     Basic Metabolic Panel; Future  -     CBC (No Diff); Future  -     ECG 12 Lead; Future  -     XR Chest 1  View; Future  -     Case Request    3. Daytime somnolence  -     Home Sleep Study; Future  -     Case Request; Standing  -     Basic Metabolic Panel; Future  -     CBC (No Diff); Future  -     ECG 12 Lead; Future  -     XR Chest 1 View; Future  -     Case Request    Other orders  -     Follow Anesthesia Guidelines / Protocol; Future  -     Obtain Informed Consent  -     Follow Anesthesia Guidelines / Protocol; Standing      Videosleep endoscopy (N/A)  Orders Placed This Encounter   Procedures   • XR Chest 1 View     Standing Status:   Future     Standing Expiration Date:   3/14/2024     Order Specific Question:   Reason for Exam:     Answer:   pre operative   • Basic Metabolic Panel     Standing Status:   Future     Standing Expiration Date:   3/14/2024     Order Specific Question:   Release to patient     Answer:   Routine Release   • CBC (No Diff)     Standing Status:   Future     Standing Expiration Date:   3/14/2024     Order Specific Question:   Release to patient     Answer:   Routine Release   • Obtain Informed Consent     Order Specific Question:   Informed Consent Given For     Answer:   Videosleep endoscopy   • ECG 12 Lead     Standing Status:   Future     Standing Expiration Date:   3/14/2024     Order Specific Question:   Reason for Exam:     Answer:   pre operative   • Home Sleep Study     Standing Status:   Future     Standing Expiration Date:   3/15/2024     Order Specific Question:   May take own meds     Answer:   Yes     Order Specific Question:   If any contraindications for HST are checked, patient may be recommended for in-lab testing. HST is indicated for patients in whom DAYRON is  suspected diagnosis.     Answer:   Does not apply     Will obtain repeat sleep study  Discussed videosleep endoscopy  Call with any new/worsening problems or concerns    Return for Recheck, post op.       Patient Instructions   Will obtain repeat sleep study  Discussed videosleep endoscopy  Call with any new/worsening  problems or concerns    CONTACT INFORMATION:  The main office phone number is [phone]. For emergencies after hours and on weekends, this number will convert over to our answering service and the on call provider will answer. Please try to keep non emergent phone calls/ questions to office hours 9am-5pm Monday through Friday.      [employer]  As an alternative, you can sign up and use the Epic MyChart system for more direct and quicker access for non emergent questions/ problems.  [employer] allows you to send messages to your doctor, view your test results, renew your prescriptions, schedule appointments, and more. To sign up, go to [email or web address] and click on the Sign Up Now link in the New User? box. Enter your [employer] Activation Code exactly as it appears below along with the last four digits of your Social Security Number and your Date of Birth () to complete the sign-up process. If you do not sign up before the expiration date, you must request a new code.     [employer] Activation Code: Activation code not generated  Current VMT[employer]bo Status: Active     If you have questions, you can email [email or web address] or call [phone] to talk to our [employer]MT[employer] staff. Remember, VMT[employer]bo is NOT to be used for urgent needs. For medical emergencies, dial 911.     IF YOU SMOKE OR USE TOBACCO PLEASE READ THE FOLLOWING:  Why is smoking bad for me?  Smoking increases the risk of heart disease, lung disease, vascular disease, stroke, and cancer. If you smoke, STOP!        IF YOU SMOKE OR USE TOBACCO PLEASE READ THE FOLLOWING:  Why is smoking bad for me?  Smoking increases the risk of heart disease, lung disease, vascular disease, stroke, and cancer. If you smoke, STOP!     For more information:  Quit Now [facility]  -QUIT-NOW  [email or web address]

## 2023-03-14 NOTE — PROGRESS NOTES
"YOB: 1955  Location: [facility]  Location Address: [address] [place] [address], [facility], [address] [place] [address]  Location Phone: [phone]    Chief Complaint   Patient presents with   • Sleep Apnea       History of Present Illness  Contreras Bennett is a 67 y.o. male.  Contreras Bennett is here for evaluation of ENT complaints. The patient has had problems with sleep apnea, snoring, and daytime fatigue.  The symptoms are not localized to a particular location. The patient has had mild to moderate symptoms. The symptoms have been present for the last several years. There have been no identified factors that aggravate the symptoms. There have been no factors that have improved the symptoms.    He has not had a recent sleep study. He has tried several CPAP masks and is unable to tolerate these well. He is interested in the inspire today.    [place]: 12  Past Medical History:   Diagnosis Date   • Allergic rhinitis Years ago   • Anxiety    • Depression    • Fatty liver    • GERD (gastroesophageal reflux disease)    • Hypertension    • Kidney stones    • Osteoarthritis    • Pneumonia    • PONV (postoperative nausea and vomiting)    • Sleep apnea     CPAP MACHINE, BUT HAS NOT USED \"IN A WHILE\"       Past Surgical History:   Procedure Laterality Date   • APPENDECTOMY     • COLONOSCOPY     • ENDOSCOPY     • GALLBLADDER SURGERY     • LIVER BIOPSY     • PENILE PROSTHESIS IMPLANT N/A 2021    Procedure: 3-PIECE INFLATABLE PENILE PROSTHESIS PLACEMENT;  Surgeon: Yonatan Jaeger MD;  Location: [facility];  Service: Urology;  Laterality: N/A;   • TONSILLECTOMY         Outpatient Medications Marked as Taking for the 3/15/23 encounter (Office Visit) with Adan Radford APRN   Medication Sig Dispense Refill   • cetirizine (zyrTEC) 10 MG tablet Take 1 tablet by mouth Daily.     • losartan (COZAAR) 25 MG tablet      • montelukast (SINGULAIR) 10 MG tablet      • omeprazole (priLOSEC) 20 MG capsule          Codeine and " Hydrocodone    Family History   Problem Relation Age of Onset   • Liver disease Father    • Diabetes Mother    • Hypertension Mother        Social History     Socioeconomic History   • Marital status:    Tobacco Use   • Smoking status: Never   • Smokeless tobacco: Never   Vaping Use   • Vaping Use: Never used   Substance and Sexual Activity   • Alcohol use: Never   • Drug use: Never   • Sexual activity: Defer       Review of Systems   Constitutional: Positive for fatigue.   HENT:        Admits snoring   Eyes: Negative.    Respiratory: Positive for apnea.    Cardiovascular: Negative.    Gastrointestinal: Negative.    Genitourinary: Negative.    Musculoskeletal: Negative.    Neurological: Negative.        Vitals:    03/15/23 1021   BP: 163/90   Pulse: 73   Resp: 16   Temp: 98 °F (36.7 °C)       Body mass index is 29.95 kg/m².    Objective     Physical Exam  Vitals reviewed.   Constitutional:       Appearance: Normal appearance.   HENT:      Head: Normocephalic and atraumatic.      Right Ear: Hearing and external ear normal.      Left Ear: Hearing and external ear normal.      Nose: Nose normal.      Mouth/Throat:      Lips: Pink.      Mouth: Mucous membranes are moist.      Pharynx: Uvula midline.      Comments: Brandon II/III  Musculoskeletal:      Cervical back: Full passive range of motion without pain.   Neurological:      Mental Status: He is alert.   Psychiatric:         Behavior: Behavior is cooperative.         Assessment & Plan   Diagnoses and all orders for this visit:    1. DAYRON (obstructive sleep apnea) (Primary)  -     Home Sleep Study; Future  -     Case Request; Standing  -     Basic Metabolic Panel; Future  -     CBC (No Diff); Future  -     ECG 12 Lead; Future  -     XR Chest 1 View; Future  -     Case Request    2. Snoring  -     Home Sleep Study; Future  -     Case Request; Standing  -     Basic Metabolic Panel; Future  -     CBC (No Diff); Future  -     ECG 12 Lead; Future  -     XR Chest 1  View; Future  -     Case Request    3. Daytime somnolence  -     Home Sleep Study; Future  -     Case Request; Standing  -     Basic Metabolic Panel; Future  -     CBC (No Diff); Future  -     ECG 12 Lead; Future  -     XR Chest 1 View; Future  -     Case Request    Other orders  -     Follow Anesthesia Guidelines / Protocol; Future  -     Obtain Informed Consent  -     Follow Anesthesia Guidelines / Protocol; Standing      Videosleep endoscopy (N/A)  Orders Placed This Encounter   Procedures   • XR Chest 1 View     Standing Status:   Future     Standing Expiration Date:   3/14/2024     Order Specific Question:   Reason for Exam:     Answer:   pre operative   • Basic Metabolic Panel     Standing Status:   Future     Standing Expiration Date:   3/14/2024     Order Specific Question:   Release to patient     Answer:   Routine Release   • CBC (No Diff)     Standing Status:   Future     Standing Expiration Date:   3/14/2024     Order Specific Question:   Release to patient     Answer:   Routine Release   • Obtain Informed Consent     Order Specific Question:   Informed Consent Given For     Answer:   Videosleep endoscopy   • ECG 12 Lead     Standing Status:   Future     Standing Expiration Date:   3/14/2024     Order Specific Question:   Reason for Exam:     Answer:   pre operative   • Home Sleep Study     Standing Status:   Future     Standing Expiration Date:   3/15/2024     Order Specific Question:   May take own meds     Answer:   Yes     Order Specific Question:   If any contraindications for HST are checked, patient may be recommended for in-lab testing. HST is indicated for patients in whom DAYRON is  suspected diagnosis.     Answer:   Does not apply     Will obtain repeat sleep study  Discussed videosleep endoscopy  Call with any new/worsening problems or concerns    Return for Recheck, post op.       Patient Instructions   Will obtain repeat sleep study  Discussed videosleep endoscopy  Call with any new/worsening  problems or concerns    CONTACT INFORMATION:  The main office phone number is [phone]. For emergencies after hours and on weekends, this number will convert over to our answering service and the on call provider will answer. Please try to keep non emergent phone calls/ questions to office hours 9am-5pm Monday through Friday.      [employer]  As an alternative, you can sign up and use the Epic MyChart system for more direct and quicker access for non emergent questions/ problems.  [employer] allows you to send messages to your doctor, view your test results, renew your prescriptions, schedule appointments, and more. To sign up, go to [employer] and click on the Sign Up Now link in the New User? box. Enter your [employer] Activation Code exactly as it appears below along with the last four digits of your Social Security Number and your Date of Birth () to complete the sign-up process. If you do not sign up before the expiration date, you must request a new code.     [employer] Activation Code: Activation code not generated  Current [employer] Status: Active     If you have questions, you can email [email or web address] or call [phone] to talk to our [employer] staff. Remember, [employer] is NOT to be used for urgent needs. For medical emergencies, dial 911.     IF YOU SMOKE OR USE TOBACCO PLEASE READ THE FOLLOWING:  Why is smoking bad for me?  Smoking increases the risk of heart disease, lung disease, vascular disease, stroke, and cancer. If you smoke, STOP!        IF YOU SMOKE OR USE TOBACCO PLEASE READ THE FOLLOWING:  Why is smoking bad for me?  Smoking increases the risk of heart disease, lung disease, vascular disease, stroke, and cancer. If you smoke, STOP!     For more information:  Quit Now [facility]  -QUIT-NOW  [email or web address]

## 2023-03-15 ENCOUNTER — OFFICE VISIT (OUTPATIENT)
Dept: OTOLARYNGOLOGY | Facility: CLINIC | Age: 68
End: 2023-03-15
Payer: MEDICARE

## 2023-03-15 VITALS
SYSTOLIC BLOOD PRESSURE: 163 MMHG | TEMPERATURE: 98 F | HEART RATE: 73 BPM | RESPIRATION RATE: 16 BRPM | HEIGHT: 73 IN | DIASTOLIC BLOOD PRESSURE: 90 MMHG | WEIGHT: 227 LBS | BODY MASS INDEX: 30.09 KG/M2

## 2023-03-15 DIAGNOSIS — R06.83 SNORING: ICD-10-CM

## 2023-03-15 DIAGNOSIS — R40.0 DAYTIME SOMNOLENCE: ICD-10-CM

## 2023-03-15 DIAGNOSIS — G47.33 OSA (OBSTRUCTIVE SLEEP APNEA): Primary | ICD-10-CM

## 2023-03-15 PROCEDURE — 1160F RVW MEDS BY RX/DR IN RCRD: CPT | Performed by: NURSE PRACTITIONER

## 2023-03-15 PROCEDURE — 1159F MED LIST DOCD IN RCRD: CPT | Performed by: NURSE PRACTITIONER

## 2023-03-15 PROCEDURE — 99204 OFFICE O/P NEW MOD 45 MIN: CPT | Performed by: NURSE PRACTITIONER

## 2023-03-15 RX ORDER — CETIRIZINE HYDROCHLORIDE 10 MG/1
1 TABLET ORAL DAILY
COMMUNITY
Start: 2023-01-20

## 2023-03-16 PROBLEM — G47.33 OSA (OBSTRUCTIVE SLEEP APNEA): Status: ACTIVE | Noted: 2023-03-16

## 2023-03-16 PROBLEM — R40.0 DAYTIME SOMNOLENCE: Status: ACTIVE | Noted: 2023-03-16

## 2023-03-16 PROBLEM — R06.83 SNORING: Status: ACTIVE | Noted: 2023-03-16

## 2023-04-04 ENCOUNTER — PRE-ADMISSION TESTING (OUTPATIENT)
Dept: PREADMISSION TESTING | Facility: HOSPITAL | Age: 68
End: 2023-04-04
Payer: MEDICARE

## 2023-04-04 ENCOUNTER — HOSPITAL ENCOUNTER (OUTPATIENT)
Dept: GENERAL RADIOLOGY | Facility: HOSPITAL | Age: 68
Discharge: HOME | End: 2023-04-04
Payer: MEDICARE

## 2023-04-04 VITALS
HEIGHT: 72 IN | SYSTOLIC BLOOD PRESSURE: 139 MMHG | DIASTOLIC BLOOD PRESSURE: 90 MMHG | RESPIRATION RATE: 16 BRPM | HEART RATE: 72 BPM | WEIGHT: 224.87 LBS | BODY MASS INDEX: 30.46 KG/M2 | OXYGEN SATURATION: 96 %

## 2023-04-04 DIAGNOSIS — R40.0 DAYTIME SOMNOLENCE: ICD-10-CM

## 2023-04-04 DIAGNOSIS — R06.83 SNORING: ICD-10-CM

## 2023-04-04 DIAGNOSIS — G47.33 OSA (OBSTRUCTIVE SLEEP APNEA): ICD-10-CM

## 2023-04-04 LAB
ANION GAP SERPL CALCULATED.3IONS-SCNC: 10 MMOL/L (ref 5–15)
BUN SERPL-MCNC: 20 MG/DL (ref 8–23)
BUN/CREAT SERPL: 20.2 (ref 7–25)
CALCIUM SPEC-SCNC: 9.2 MG/DL (ref 8.6–10.5)
CHLORIDE SERPL-SCNC: 105 MMOL/L (ref 98–107)
CO2 SERPL-SCNC: 24 MMOL/L (ref 22–29)
CREAT SERPL-MCNC: 0.99 MG/DL (ref 0.76–1.27)
DEPRECATED RDW RBC AUTO: 40.6 FL (ref 37–54)
EGFRCR SERPLBLD CKD-EPI 2021: 84.5 ML/MIN/1.73
ERYTHROCYTE [DISTWIDTH] IN BLOOD BY AUTOMATED COUNT: 13.2 % (ref 12.3–15.4)
GLUCOSE SERPL-MCNC: 96 MG/DL (ref 65–99)
HCT VFR BLD AUTO: 48.3 % (ref 37.5–51)
HGB BLD-MCNC: 15.9 G/DL (ref 13–17.7)
MCH RBC QN AUTO: 27.7 PG (ref 26.6–33)
MCHC RBC AUTO-ENTMCNC: 32.9 G/DL (ref 31.5–35.7)
MCV RBC AUTO: 84.3 FL (ref 79–97)
PLATELET # BLD AUTO: 163 10*3/MM3 (ref 140–450)
PMV BLD AUTO: 9.7 FL (ref 6–12)
POTASSIUM SERPL-SCNC: 3.9 MMOL/L (ref 3.5–5.2)
RBC # BLD AUTO: 5.73 10*6/MM3 (ref 4.14–5.8)
SODIUM SERPL-SCNC: 139 MMOL/L (ref 136–145)
WBC NRBC COR # BLD: 5.26 10*3/MM3 (ref 3.4–10.8)

## 2023-04-04 PROCEDURE — 93005 ELECTROCARDIOGRAM TRACING: CPT

## 2023-04-04 PROCEDURE — 85027 COMPLETE CBC AUTOMATED: CPT

## 2023-04-04 PROCEDURE — 93010 ELECTROCARDIOGRAM REPORT: CPT | Performed by: INTERNAL MEDICINE

## 2023-04-04 PROCEDURE — 80048 BASIC METABOLIC PNL TOTAL CA: CPT

## 2023-04-04 PROCEDURE — 71045 X-RAY EXAM CHEST 1 VIEW: CPT

## 2023-04-04 PROCEDURE — 36415 COLL VENOUS BLD VENIPUNCTURE: CPT

## 2023-04-04 NOTE — DISCHARGE INSTRUCTIONS
Before you come to the hospital        Arrival time: AS DIRECTED BY OFFICE     YOU MAY TAKE THE FOLLOWING MEDICATION(S) THE MORNING OF SURGERY WITH A SIP OF WATER: NONE    DO NOT TAKE LOSARTAN 24 HOURS PRIOR TO SURGERY           ALL OTHER HOME MEDICATION CHECK WITH YOUR PHYSICIAN (especially if   you are taking diabetes medicines or blood thinners)    Do not take any Erectile Dysfunction medications (EX: CIALIS, VIAGRA) 24 hours prior to surgery.      If you were given and instructed to use a germ- killing soap, use as directed the night before surgery and again the morning of surgery or as directed by your surgeon. (Use one-half of the bottle with each shower.)   See attached information for How to Use Chlorhexidine for Bathing if applicable.            Eating and drinking restrictions prior to scheduled arrival time    2 Hours before arrival time STOP   Drinking Clear liquids (water, apple juice-no pulp)     6 Hours before arrival time STOP   Milk or drinks that contain milk, full liquids    6 Hours before arrival time STOP   Light meals or foods, such as toast or cereal    8 Hours before arrival time STOP   Heavy foods, such as meat, fried foods, or fatty foods    (It is extremely important that you follow these guidelines to prevent delay or cancelation of your procedure)     Clear Liquids  Water and flavored water                                                                      Clear Fruit juices, such as cranberry juice and apple juice.  Black coffee (NO cream of any kind, including powdered).  Plain tea  Clear bouillon or broth.  Flavored gelatin.  Soda.  Gatorade or Powerade.  Full liquid examples  Juices that have pulp.  Frozen ice pops that contain fruit pieces.  Coffee with creamer  Milk.  Yogurt.                MANAGING PAIN AFTER SURGERY    We know you are probably wondering what your pain will be like after surgery.  Following surgery it is unrealistic to expect you will not have pain.   Pain is  how our bodies let us know that something is wrong or cautions us to be careful.  That said, our goal is to make your pain tolerable.    Methods we may use to treat your pain include (oral or IV medications, PCAs, epidurals, nerve blocks, etc.)   While some procedures require IV pain medications for a short time after surgery, transitioning to pain medications by mouth allows for better management of pain.   Your nurse will encourage you to take oral pain medications whenever possible.  IV medications work almost immediately, but only last a short while.  Taking medications by mouth allows for a more constant level of medication in your blood stream for a longer period of time.      Once your pain is out of control it is harder to get back under control.  It is important you are aware when your next dose of pain medication is due.  If you are admitted, your nurse may write the time of your next dose on the white board in your room to help you remember.      We are interested in your pain and encourage you to inform us about aggravating factors during your visit.   Many times a simple repositioning every few hours can make a big difference.    If your physician says it is okay, do not let your pain prevent you from getting out of bed. Be sure to call your nurse for assistance prior to getting up so you do not fall.      Before surgery, please decide your tolerable pain goal.  These faces help describe the pain ratings we use on a 0-10 scale.   Be prepared to tell us your goal and whether or not you take pain or anxiety medications at home.          Preparing for Surgery  Preparing for surgery is an important part of your care. It can make things go more smoothly and help you avoid complications. The steps leading up to surgery may vary among hospitals. Follow all instructions given to you by your health care providers. Ask questions if you do not understand something. Talk about any concerns that you have.  Here are  some questions to consider asking before your surgery:  If my surgery is not an emergency (is elective), when would be the best time to have the surgery?  What arrangements do I need to make for work, home, or school?  What will my recovery be like? How long will it be before I can return to normal activities?  Will I need to prepare my home? Will I need to arrange care for me or my children?  Should I expect to have pain after surgery? What are my pain management options? Are there nonmedical options that I can try for pain?  Tell a health care provider about:  Any allergies you have.  All medicines you are taking, including vitamins, herbs, eye drops, creams, and over-the-counter medicines.  Any problems you or family members have had with anesthetic medicines.  Any blood disorders you have.  Any surgeries you have had.  Any medical conditions you have.  Whether you are pregnant or may be pregnant.  What are the risks?  The risks and complications of surgery depend on the specific procedure that you have. Discuss all the risks with your health care providers before your surgery. Ask about common surgical complications, which may include:  Infection.  Bleeding or a need for blood replacement (transfusion).  Allergic reactions to medicines.  Damage to surrounding nerves, tissues, or structures.  A blood clot.  Scarring.  Failure of the surgery to correct the problem.  Follow these instructions before the procedure:  Several days or weeks before your procedure  You may have a physical exam by your primary health care provider to make sure it is safe for you to have surgery.  You may have testing. This may include a chest X-ray, blood and urine tests, electrocardiogram (ECG), or other testing.  Ask your health care provider about:  Changing or stopping your regular medicines. This is especially important if you are taking diabetes medicines or blood thinners.  Taking medicines such as aspirin and ibuprofen. These  medicines can thin your blood. Do not take these medicines unless your health care provider tells you to take them.  Taking over-the-counter medicines, vitamins, herbs, and supplements.  Do not use any products that contain nicotine or tobacco, such as cigarettes and e-cigarettes. If you need help quitting, ask your health care provider.  Avoid alcohol.  Ask your health care provider if there are exercises you can do to prepare for surgery.  Eat a healthy diet.   Plan to have someone 18 years of age or older to take you home from the hospital. We will need to verify your ride on the morning of surgery if you are being discharged home on the same day. Tell your ride to be expecting a call from the hospital prior to your procedure.   Plan to have a responsible adult care for you for at least 24 hours after you leave the hospital or clinic. This is important.  The day before your procedure  You may be given antibiotic medicine to take by mouth to help prevent infection. Take it as told by your health care provider.  You may be asked to shower with a germ-killing soap.  Follow instructions from your health care provider about eating and drinking restrictions. This includes gum, mints and hard candy.  Pack comfortable clothes according to your procedure.   The day of your procedure  You may need to take another shower with a germ-killing soap before you leave home in the morning.  With a small sip of water, take only the medicines that you are told to take.  Remove all jewelry including rings.   Leave anything you consider valuable at home except hearing aids if needed.  You do not need to bring your home medications into the hospital.   Do not wear any makeup, nail polish, powder, deodorant, lotion, hair accessories, or anything on your skin or body except your clothes.  If you will be staying in the hospital, bring a case to hold your glasses, contacts, or dentures. You may also want to bring your robe and non-skid  footwear.       (Do not use denture adhesives since you will be asked to remove them during  surgery).   If you wear oxygen at home, bring it with you the day of surgery.  If instructed by your health care provider, bring your sleep apnea device with you on the day of your surgery (if this applies to you).  You may want to leave your suitcase and sleep apnea device in the car until after surgery.   Arrive at the hospital as scheduled.  Bring a friend or family member with you who can help to answer questions and be present while you meet with your health care provider.  At the hospital  When you arrive at the hospital:  Go to registration located at the main entrance of the hospital. You will be registered and given a beeper and a sticker sheet. Take the stickers to the Outpatient nurses desk and place in the black tray. This is to notify staff that you have arrived. Then return to the lobby to wait.   When your beeper lights up and vibrates proceed through the double doors, under the stairs, and a member of the Outpatient Surgery staff will escort you to your preoperative room.  You may have to wear compression sleeves. These help to prevent blood clots and reduce swelling in your legs.  An IV may be inserted into one of your veins.              In the operating room, you may be given one or more of the following:        A medicine to help you relax (sedative).        A medicine to numb the area (local anesthetic).        A medicine to make you fall asleep (general anesthetic).        A medicine that is injected into an area of your body to numb everything below the                      injection site (regional anesthetic).  You may be given an antibiotic through your IV to help prevent infection.  Your surgical site will be marked or identified.    Contact a health care provider if you:  Develop a fever of more than 100.4°F (38°C) or other feelings of illness during the 48 hours before your surgery.  Have symptoms  that get worse.  Have questions or concerns about your surgery.  Summary  Preparing for surgery can make the procedure go more smoothly and lower your risk of complications.  Before surgery, make a list of questions and concerns to discuss with your surgeon. Ask about the risks and possible complications.  In the days or weeks before your surgery, follow all instructions from your health care provider. You may need to stop smoking, avoid alcohol, follow eating restrictions, and change or stop your regular medicines.  Contact your surgeon if you develop a fever or other signs of illness during the few days before your surgery.  This information is not intended to replace advice given to you by your health care provider. Make sure you discuss any questions you have with your health care provider.  Document Revised: 12/21/2018 Document Reviewed: 10/23/2018  Elsevier Patient Education © 2021 [employer] Inc[employer]

## 2023-04-05 LAB
QT INTERVAL: 414 MS
QTC INTERVAL: 440 MS

## 2023-04-07 ENCOUNTER — ANESTHESIA (OUTPATIENT)
Dept: PERIOP | Facility: HOSPITAL | Age: 68
End: 2023-04-07
Payer: MEDICARE

## 2023-04-07 ENCOUNTER — ANESTHESIA EVENT (OUTPATIENT)
Dept: PERIOP | Facility: HOSPITAL | Age: 68
End: 2023-04-07
Payer: MEDICARE

## 2023-04-07 ENCOUNTER — HOSPITAL ENCOUNTER (OUTPATIENT)
Facility: HOSPITAL | Age: 68
Setting detail: HOSPITAL OUTPATIENT SURGERY
Discharge: HOME | End: 2023-04-07
Attending: OTOLARYNGOLOGY | Admitting: OTOLARYNGOLOGY
Payer: MEDICARE

## 2023-04-07 VITALS
HEART RATE: 59 BPM | RESPIRATION RATE: 18 BRPM | SYSTOLIC BLOOD PRESSURE: 135 MMHG | TEMPERATURE: 96.3 F | DIASTOLIC BLOOD PRESSURE: 82 MMHG | OXYGEN SATURATION: 94 %

## 2023-04-07 PROCEDURE — 42975 DISE EVAL SLP DO BRTH FLX DX: CPT | Performed by: OTOLARYNGOLOGY

## 2023-04-07 PROCEDURE — 25010000002 PROPOFOL 1000 MG/100ML EMULSION

## 2023-04-07 RX ORDER — ONDANSETRON 4 MG/1
4 TABLET, FILM COATED ORAL ONCE AS NEEDED
Status: DISCONTINUED | OUTPATIENT
Start: 2023-04-07 | End: 2023-04-07 | Stop reason: HOSPADM

## 2023-04-07 RX ORDER — LIDOCAINE HYDROCHLORIDE 20 MG/ML
INJECTION, SOLUTION EPIDURAL; INFILTRATION; INTRACAUDAL; PERINEURAL AS NEEDED
Status: DISCONTINUED | OUTPATIENT
Start: 2023-04-07 | End: 2023-04-07 | Stop reason: SURG

## 2023-04-07 RX ORDER — SODIUM CHLORIDE 9 MG/ML
40 INJECTION, SOLUTION INTRAVENOUS AS NEEDED
Status: DISCONTINUED | OUTPATIENT
Start: 2023-04-07 | End: 2023-04-07 | Stop reason: HOSPADM

## 2023-04-07 RX ORDER — SODIUM CHLORIDE 0.9 % (FLUSH) 0.9 %
3 SYRINGE (ML) INJECTION AS NEEDED
Status: DISCONTINUED | OUTPATIENT
Start: 2023-04-07 | End: 2023-04-07 | Stop reason: HOSPADM

## 2023-04-07 RX ORDER — PROPOFOL 10 MG/ML
INJECTION, EMULSION INTRAVENOUS AS NEEDED
Status: DISCONTINUED | OUTPATIENT
Start: 2023-04-07 | End: 2023-04-07 | Stop reason: SURG

## 2023-04-07 RX ORDER — SODIUM CHLORIDE 0.9 % (FLUSH) 0.9 %
3 SYRINGE (ML) INJECTION EVERY 12 HOURS SCHEDULED
Status: DISCONTINUED | OUTPATIENT
Start: 2023-04-07 | End: 2023-04-07 | Stop reason: HOSPADM

## 2023-04-07 RX ORDER — LABETALOL HYDROCHLORIDE 5 MG/ML
5 INJECTION, SOLUTION INTRAVENOUS
Status: DISCONTINUED | OUTPATIENT
Start: 2023-04-07 | End: 2023-04-07 | Stop reason: HOSPADM

## 2023-04-07 RX ORDER — ONDANSETRON 2 MG/ML
4 INJECTION INTRAMUSCULAR; INTRAVENOUS ONCE AS NEEDED
Status: DISCONTINUED | OUTPATIENT
Start: 2023-04-07 | End: 2023-04-07 | Stop reason: HOSPADM

## 2023-04-07 RX ORDER — FLUMAZENIL 0.1 MG/ML
0.2 INJECTION INTRAVENOUS AS NEEDED
Status: DISCONTINUED | OUTPATIENT
Start: 2023-04-07 | End: 2023-04-07 | Stop reason: HOSPADM

## 2023-04-07 RX ORDER — DROPERIDOL 2.5 MG/ML
0.62 INJECTION, SOLUTION INTRAMUSCULAR; INTRAVENOUS ONCE AS NEEDED
Status: DISCONTINUED | OUTPATIENT
Start: 2023-04-07 | End: 2023-04-07 | Stop reason: HOSPADM

## 2023-04-07 RX ORDER — LIDOCAINE HYDROCHLORIDE 10 MG/ML
0.5 INJECTION, SOLUTION EPIDURAL; INFILTRATION; INTRACAUDAL; PERINEURAL ONCE AS NEEDED
Status: DISCONTINUED | OUTPATIENT
Start: 2023-04-07 | End: 2023-04-07 | Stop reason: HOSPADM

## 2023-04-07 RX ORDER — SODIUM CHLORIDE, SODIUM LACTATE, POTASSIUM CHLORIDE, CALCIUM CHLORIDE 600; 310; 30; 20 MG/100ML; MG/100ML; MG/100ML; MG/100ML
100 INJECTION, SOLUTION INTRAVENOUS CONTINUOUS
Status: DISCONTINUED | OUTPATIENT
Start: 2023-04-07 | End: 2023-04-07 | Stop reason: HOSPADM

## 2023-04-07 RX ORDER — SODIUM CHLORIDE 0.9 % (FLUSH) 0.9 %
3-10 SYRINGE (ML) INJECTION AS NEEDED
Status: DISCONTINUED | OUTPATIENT
Start: 2023-04-07 | End: 2023-04-07 | Stop reason: HOSPADM

## 2023-04-07 RX ORDER — OXYMETAZOLINE HYDROCHLORIDE 0.05 G/100ML
2 SPRAY NASAL
Status: COMPLETED | OUTPATIENT
Start: 2023-04-07 | End: 2023-04-07

## 2023-04-07 RX ORDER — NALOXONE HCL 0.4 MG/ML
0.4 VIAL (ML) INJECTION AS NEEDED
Status: DISCONTINUED | OUTPATIENT
Start: 2023-04-07 | End: 2023-04-07 | Stop reason: HOSPADM

## 2023-04-07 RX ORDER — SODIUM CHLORIDE, SODIUM LACTATE, POTASSIUM CHLORIDE, CALCIUM CHLORIDE 600; 310; 30; 20 MG/100ML; MG/100ML; MG/100ML; MG/100ML
1000 INJECTION, SOLUTION INTRAVENOUS CONTINUOUS
Status: DISCONTINUED | OUTPATIENT
Start: 2023-04-07 | End: 2023-04-07 | Stop reason: HOSPADM

## 2023-04-07 RX ADMIN — PROPOFOL 150 MG: 10 INJECTION, EMULSION INTRAVENOUS at 11:44

## 2023-04-07 RX ADMIN — SODIUM CHLORIDE, POTASSIUM CHLORIDE, SODIUM LACTATE AND CALCIUM CHLORIDE 1000 ML: 600; 310; 30; 20 INJECTION, SOLUTION INTRAVENOUS at 08:27

## 2023-04-07 RX ADMIN — OXYMETAZOLINE HYDROCHLORIDE 2 SPRAY: 0.05 SPRAY NASAL at 10:34

## 2023-04-07 RX ADMIN — LIDOCAINE HYDROCHLORIDE 100 MG: 20 INJECTION, SOLUTION EPIDURAL; INFILTRATION; INTRACAUDAL; PERINEURAL at 11:44

## 2023-04-07 NOTE — ANESTHESIA POSTPROCEDURE EVALUATION
Patient: Contreras Bennett    Procedure Summary     Date: 04/07/23 Room / Location:  [facility] /  [facility]    Anesthesia Start: 1140 Anesthesia Stop: 1159    Procedure: Videosleep endoscopy Diagnosis:       DAYRON (obstructive sleep apnea)      Snoring      Daytime somnolence      (DAYRON (obstructive sleep apnea) [G47.33])      (Snoring [R06.83])      (Daytime somnolence [R40.0])    Surgeons: Wil Jones MD Provider: Reed Joyner CRNA    Anesthesia Type: MAC ASA Status: 2          Anesthesia Type: MAC    Vitals  Vitals Value Taken Time   BP     Temp     Pulse 74 04/07/23 1159   Resp     SpO2 94 % 04/07/23 1159   Vitals shown include unvalidated device data.        Post Anesthesia Care and Evaluation    Patient location during evaluation: PHASE II  Patient participation: complete - patient participated  Level of consciousness: awake and alert  Pain management: adequate    Airway patency: patent  Anesthetic complications: No anesthetic complications  PONV Status: none  Cardiovascular status: acceptable and blood pressure returned to baseline  Respiratory status: acceptable  Hydration status: acceptable  No anesthesia care post op

## 2023-04-07 NOTE — ANESTHESIA PREPROCEDURE EVALUATION
Anesthesia Evaluation     Patient summary reviewed and Nursing notes reviewed   history of anesthetic complications: PONV  NPO Solid Status: > 8 hours  NPO Liquid Status: > 8 hours           Airway   Mallampati: II  TM distance: >3 FB  Neck ROM: full  No difficulty expected  Dental          Pulmonary    (+) sleep apnea,   Cardiovascular   Exercise tolerance: good (4-7 METS)    (+) hypertension,   (-) CAD      Neuro/Psych  (-) seizures, TIA, CVA  GI/Hepatic/Renal/Endo    (+)  GERD,  liver disease fatty liver disease, renal disease stones,   (-) diabetes    Musculoskeletal     Abdominal    Substance History      OB/GYN          Other   arthritis,                      Anesthesia Plan    ASA 2     MAC     intravenous induction     Anesthetic plan, risks, benefits, and alternatives have been provided, discussed and informed consent has been obtained with: patient.        CODE STATUS:

## 2023-04-07 NOTE — OP NOTE
OPERATIVE NOTE  4/7/2023    NAME: Contreras Bennett    YOB: 1955  MRN: [id number]    PRE-OPERATIVE DIAGNOSIS:    DAYRON (obstructive sleep apnea) [G47.33]  Snoring [R06.83]  Daytime somnolence [R40.0]    POST-OPERATIVE DIAGNOSIS:   Post-Op Diagnosis Codes:     * DAYRON (obstructive sleep apnea) [G47.33]     * Snoring [R06.83]     * Daytime somnolence [R40.0]    PROCEDURE PERFORMED:   Drug-induced video sleep endoscopy    SURGEON:   Wil Jones MD    ASSISTANT(S):   None    ANESTHESIA:   IV sedation    INDICATIONS: The patient is a 67 y.o. male with DAYRON (obstructive sleep apnea) [G47.33]  Snoring [R06.83]  Daytime somnolence [R40.0]    PROCEDURE:  The patient was brought to the operating room, given IV sedation, and prepped and draped in the usual manner.    The flexible endoscope was inserted to examine both sides of the nose as well as the pharynx and larynx.     The VOTE score at baseline was nearly complete AP collapse with essentially no or very minimal lateral collapse.  With simulated jaw advancement and tongue advancement, the hypopharyngeal obstruction and secondarily the palatal collapse also improved.    Hypopharyngeal and endolaryngeal examination demonstrated mild lymphoid hyperplasia at the base of the tongue with mild interarytenoid edema consistent with mild laryngopharyngeal reflux.     In summary, there was no evidence of complete concentric palatal obstruction.     The patient was transported upon extubation to the postanesthesia care unit in stable condition.    SPECIMENS:  None    COMPLICATIONS: NONE    ESTIMATED BLOOD LOSS:  None    Wil Jones MD  4/7/2023

## 2023-04-28 NOTE — PROGRESS NOTES
"YOB: 1955  Location: [facility]  Location Address: [address] [place] [address], [facility], [address] [place] [address]  Location Phone: [phone]    Chief Complaint   Patient presents with   • Sleep Apnea       History of Present Illness  Contreras Bennett is a 67 y.o. male.  Contreras Bennett is here for follow up of ENT complaints. The patient has had problems with sleep apnea, snoring, and daytime fatigue. The symptoms are not localized to a particular location. The patient has had mild to moderate symptoms. The symptoms have been present for the last several years. There have been no identified factors that aggravate the symptoms. There have been no factors that have improved the symptoms.    He has tried several CPAP masks and is unable to tolerate these well. He has had a video sleep endoscopy. He has not had a recent sleep study.    [place]: 17    Past Medical History:   Diagnosis Date   • Allergic rhinitis Years ago   • Anxiety    • Depression    • Fatty liver    • GERD (gastroesophageal reflux disease)    • Hypertension    • Kidney stones    • Osteoarthritis    • Pneumonia    • PONV (postoperative nausea and vomiting)    • Sleep apnea     CPAP MACHINE, BUT HAS NOT USED \"IN A WHILE\"       Past Surgical History:   Procedure Laterality Date   • APPENDECTOMY     • COLONOSCOPY     • ENDOSCOPY     • GALLBLADDER SURGERY     • LIVER BIOPSY     • PENILE PROSTHESIS IMPLANT N/A 2021    Procedure: 3-PIECE INFLATABLE PENILE PROSTHESIS PLACEMENT;  Surgeon: Yonatan Jaeger MD;  Location: [facility];  Service: Urology;  Laterality: N/A;   • SLEEP ENDOSCOPY N/A 2023    Procedure: Videosleep endoscopy;  Surgeon: Wil Jones MD;  Location: [facility];  Service: ENT;  Laterality: N/A;   • TONSILLECTOMY         Outpatient Medications Marked as Taking for the 23 encounter (Office Visit) with Wil Jones MD   Medication Sig Dispense Refill   • cetirizine (zyrTEC) 10 MG tablet Take 1 tablet by " mouth Daily.     • losartan (COZAAR) 25 MG tablet      • montelukast (SINGULAIR) 10 MG tablet      • omeprazole (priLOSEC) 20 MG capsule          Codeine and Hydrocodone    Family History   Problem Relation Age of Onset   • Liver disease Father    • Diabetes Mother    • Hypertension Mother        Social History     Socioeconomic History   • Marital status:    Tobacco Use   • Smoking status: Never   • Smokeless tobacco: Never   Vaping Use   • Vaping Use: Never used   Substance and Sexual Activity   • Alcohol use: Never   • Drug use: Never   • Sexual activity: Defer       Review of Systems   Constitutional: Positive for fatigue.   HENT: Negative.    Respiratory: Positive for apnea.         Admits snoring   Cardiovascular: Negative.    Gastrointestinal: Negative.        Vitals:    05/02/23 0859   BP: 164/97   Pulse: 65   Resp: 16   Temp: 97.7 °F (36.5 °C)       Body mass index is 29.69 kg/m².    Objective     Physical Exam  Vitals reviewed.   Constitutional:       Appearance: Normal appearance.   HENT:      Head: Normocephalic and atraumatic.      Right Ear: Hearing and external ear normal.      Left Ear: Hearing and external ear normal.      Nose: Nose normal.      Mouth/Throat:      Lips: Pink.      Mouth: Mucous membranes are moist.      Pharynx: Uvula midline.      Comments: Brandon II/III  Tonsils surgically absent  Musculoskeletal:      Cervical back: Full passive range of motion without pain.   Neurological:      Mental Status: He is alert.   Psychiatric:         Behavior: Behavior is cooperative.         Assessment & Plan   Diagnoses and all orders for this visit:    1. DAYRON (obstructive sleep apnea) (Primary)    2. Snoring    3. Daytime somnolence      * Surgery not found *  No orders of the defined types were placed in this encounter.    Will obtain home sleep study    Return for Recheck.       Patient Instructions   CONTACT INFORMATION:  The main office phone number is [phone]. For emergencies  after hours and on weekends, this number will convert over to our answering service and the on call provider will answer. Please try to keep non emergent phone calls/ questions to office hours 9am-5pm Monday through Friday.      Isentio  As an alternative, you can sign up and use the Epic MyChart system for more direct and quicker access for non emergent questions/ problems.  TripChamp allows you to send messages to your doctor, view your test results, renew your prescriptions, schedule appointments, and more. To sign up, go to [employer] and click on the Sign Up Now link in the New User? box. Enter your Isentio Activation Code exactly as it appears below along with the last four digits of your Social Security Number and your Date of Birth () to complete the sign-up process. If you do not sign up before the expiration date, you must request a new code.     Isentio Activation Code: Activation code not generated  Current Isentio Status: Active     If you have questions, you can email [email or web address] or call [phone] to talk to our Isentio staff. Remember, Isentio is NOT to be used for urgent needs. For medical emergencies, dial 911.     IF YOU SMOKE OR USE TOBACCO PLEASE READ THE FOLLOWING:  Why is smoking bad for me?  Smoking increases the risk of heart disease, lung disease, vascular disease, stroke, and cancer. If you smoke, STOP!        IF YOU SMOKE OR USE TOBACCO PLEASE READ THE FOLLOWING:  Why is smoking bad for me?  Smoking increases the risk of heart disease, lung disease, vascular disease, stroke, and cancer. If you smoke, STOP!     For more information:  Quit Now [place]  -QUIT-NOW  [email or web address]

## 2023-05-02 ENCOUNTER — OFFICE VISIT (OUTPATIENT)
Dept: OTOLARYNGOLOGY | Facility: CLINIC | Age: 68
End: 2023-05-02
Payer: MEDICARE

## 2023-05-02 ENCOUNTER — TELEPHONE (OUTPATIENT)
Dept: OTOLARYNGOLOGY | Facility: CLINIC | Age: 68
End: 2023-05-02
Payer: MEDICARE

## 2023-05-02 VITALS
BODY MASS INDEX: 29.82 KG/M2 | TEMPERATURE: 97.7 F | HEIGHT: 73 IN | HEART RATE: 65 BPM | DIASTOLIC BLOOD PRESSURE: 97 MMHG | SYSTOLIC BLOOD PRESSURE: 164 MMHG | WEIGHT: 225 LBS | RESPIRATION RATE: 16 BRPM

## 2023-05-02 DIAGNOSIS — R06.83 SNORING: ICD-10-CM

## 2023-05-02 DIAGNOSIS — G47.33 OSA (OBSTRUCTIVE SLEEP APNEA): Primary | ICD-10-CM

## 2023-05-02 DIAGNOSIS — R40.0 DAYTIME SOMNOLENCE: ICD-10-CM

## 2023-05-02 NOTE — PATIENT INSTRUCTIONS
CONTACT INFORMATION:  The main office phone number is [phone]. For emergencies after hours and on weekends, this number will convert over to our answering service and the on call provider will answer. Please try to keep non emergent phone calls/ questions to office hours 9am-5pm Monday through Friday.      EatStreet  As an alternative, you can sign up and use the Epic MyChart system for more direct and quicker access for non emergent questions/ problems.  Zenbox allows you to send messages to your doctor, view your test results, renew your prescriptions, schedule appointments, and more. To sign up, go to [employer] and click on the Sign Up Now link in the New User? box. Enter your EatStreet Activation Code exactly as it appears below along with the last four digits of your Social Security Number and your Date of Birth () to complete the sign-up process. If you do not sign up before the expiration date, you must request a new code.     EatStreet Activation Code: Activation code not generated  Current EatStreet Status: Active     If you have questions, you can email [email or web address] or call [phone] to talk to our EatStreet staff. Remember, EatStreet is NOT to be used for urgent needs. For medical emergencies, dial 911.     IF YOU SMOKE OR USE TOBACCO PLEASE READ THE FOLLOWING:  Why is smoking bad for me?  Smoking increases the risk of heart disease, lung disease, vascular disease, stroke, and cancer. If you smoke, STOP!        IF YOU SMOKE OR USE TOBACCO PLEASE READ THE FOLLOWING:  Why is smoking bad for me?  Smoking increases the risk of heart disease, lung disease, vascular disease, stroke, and cancer. If you smoke, STOP!     For more information:  Quit Now [place]  -QUIT-NOW  [email or web address]

## 2023-05-02 NOTE — TELEPHONE ENCOUNTER
Called Meg at [facility] and set up home sleep study appt for May 16th at 9 am.    never used

## 2023-05-16 ENCOUNTER — HOSPITAL ENCOUNTER (OUTPATIENT)
Dept: SLEEP CENTER | Age: 68
Discharge: HOME | End: 2023-05-18
Payer: MEDICARE

## 2023-05-16 PROCEDURE — G0399 HOME SLEEP TEST/TYPE 3 PORTA: HCPCS

## 2023-05-17 PROCEDURE — G0399 HOME SLEEP TEST/TYPE 3 PORTA: HCPCS

## 2023-05-19 NOTE — PROGRESS NOTES
[address] [place] [address]  Phone [phone] Fax [phone]        Patient Name Jihan Hines Single Account Number [de-identified]    1955 Referring Provider SHELBIE Bartholomew   Age/ Gender 74 years/M Interpreting physician Devin Reyes M.D.,[facility]   Neck circumference Unavailable Device Stardust II   Mallampati classification Unavailable Scoring Technician Alanna Ureña, RRT,RPSGT   [place] score Unavailable Indications for the test excessive daytime somnolence   Height 73.0 in Test Home Sleep Apnea Test   Weight 227.0 lbs Date of test 2023   BMI 29.9 Time in Bed (TIB) 461.0 minutes     Events   Index (#/hr) Total # Events Mean Duration (sec) Max Duration (sec) Supine        # Index   Central Apneas 0.4 3 17.2 19.0 3 0.6   Obstructive Apneas 4.0 31 19.0 34.5 31 6.6   Mixed Apneas 0.0 0 0.0 0.0 0 0.0   Hypopneas 8.2 63 23.1 58.5 59 12.6   Estimated AHI  #events/TIB (hrs) 12.6 97 21.6 58.5 19.8   Time in Position (minutes) 282.0     Snoring  Snoring Rating (loudness 0-4) 3   Snoring Amount (% of total sleep time with snoring) 78.4     Oximetry distribution  <90 %  (minutes) 10.0   <85 %  (minutes) 0.0   <80 %  (minutes) 0.0   <75 %  (minutes) 0.0   <70 %  (minutes) 0.0   <60 %  (minutes) 0.0   <50 %  (minutes) 0.0   Average (%) 92   Desat Index (#/hour) 12.4   Desat Max (%) 6   Desat Max dur (sec) 66.0   Lowest SpO2 (³ 2 sec) (%) 86     Heart Rate  Mean HR (BPM) 61.1   # of LHR 1   LHR min (BPM) 52     # of HHR 1   HHR max (BPM)  89        Interpretation:     Mild obstructive sleep apnea. Obesity. Subjective hypersomnia. Hypoxia during sleep. Recommendations:    Consider Auto CPAP to titrate between 8cm water pressure and 20cm water pressure. Weight loss and exercise. Avoid risky activity such as driving if sleepy. Discuss sleep hygiene with the patient. Monitor PAP use and effectiveness.        Marissa Prieto MD,
Pt in the office to  HST monitor. Pt was educated on how to use equipment properly and instructed to wear for 2 nights and to return on Thursday. Pt states he understood.
FCCP, DABSM

## 2023-05-20 DIAGNOSIS — G47.33 OSA (OBSTRUCTIVE SLEEP APNEA): Primary | ICD-10-CM

## 2023-08-16 NOTE — PROGRESS NOTES
"YOB: 1955  Location: [facility]  Location Address: [address] [place] [address], [facility], [address] [place] [address]  Location Phone: [phone]    Chief Complaint   Patient presents with    Sleep Apnea       History of Present Illness  Contreras Bennett is a 67 y.o. male.  Contreras Bennett is here for follow up of ENT complaints. The patient has had problems with sleep apnea, snoring, daytime fatigue, and poor CPAP tolerance. Patient has tried cpap in the past, this was several years ago (3- 4)   He states he tolerated well, until he had an upper respiratory illness and then found he was unable to tolerate   He has not tried cpap since that time       AHI: 12.6  EPWORTH: 17    Past Medical History:   Diagnosis Date    Allergic rhinitis Years ago    Anxiety     Depression     Fatty liver     GERD (gastroesophageal reflux disease)     Hypertension     Kidney stones     Osteoarthritis     Pneumonia     PONV (postoperative nausea and vomiting)     Sleep apnea     CPAP MACHINE, BUT HAS NOT USED \"IN A WHILE\"       Past Surgical History:   Procedure Laterality Date    APPENDECTOMY      COLONOSCOPY      ENDOSCOPY      GALLBLADDER SURGERY      LIVER BIOPSY      PENILE PROSTHESIS IMPLANT N/A 2021    Procedure: 3-PIECE INFLATABLE PENILE PROSTHESIS PLACEMENT;  Surgeon: Yonatan Jaeger MD;  Location: [facility];  Service: Urology;  Laterality: N/A;    SLEEP ENDOSCOPY N/A 2023    Procedure: Videosleep endoscopy;  Surgeon: Wil Jones MD;  Location: [facility];  Service: ENT;  Laterality: N/A;    TONSILLECTOMY         Outpatient Medications Marked as Taking for the 23 encounter (Office Visit) with Wil Jones MD   Medication Sig Dispense Refill    cetirizine (zyrTEC) 10 MG tablet Take 1 tablet by mouth Daily.      losartan (COZAAR) 25 MG tablet       montelukast (SINGULAIR) 10 MG tablet       omeprazole (priLOSEC) 20 MG capsule          Codeine and Hydrocodone    Family History   Problem " Relation Age of Onset    Liver disease Father     Diabetes Mother     Hypertension Mother        Social History     Socioeconomic History    Marital status:    Tobacco Use    Smoking status: Never    Smokeless tobacco: Never   Vaping Use    Vaping Use: Never used   Substance and Sexual Activity    Alcohol use: Never    Drug use: Never    Sexual activity: Defer       Review of Systems   Constitutional:  Positive for fatigue.   Psychiatric/Behavioral:  Positive for sleep disturbance.      Vitals:    08/17/23 1339   BP: 170/97   Pulse: 72   Resp: 16   Temp: 97.8 øF (36.6 øC)       Body mass index is 29.69 kg/mý.    Objective     Physical Exam  Vitals reviewed.   Constitutional:       Appearance: Normal appearance.   HENT:      Head: Normocephalic and atraumatic.      Right Ear: Hearing and external ear normal.      Left Ear: Hearing and external ear normal.      Nose: Nose normal.      Mouth/Throat:      Lips: Pink.      Mouth: Mucous membranes are moist.      Pharynx: Uvula midline.      Comments: Brandon II  Tonsils surgically absent  Musculoskeletal:      Cervical back: Full passive range of motion without pain.   Neurological:      Mental Status: He is alert.   Psychiatric:         Behavior: Behavior is cooperative.       Assessment & Plan   Diagnoses and all orders for this visit:    1. DAYRON (obstructive sleep apnea) (Primary)  -     Ambulatory Referral to Sleep Medicine    2. Snoring  -     Ambulatory Referral to Sleep Medicine    3. Daytime somnolence  -     Ambulatory Referral to Sleep Medicine      * Surgery not found *  Orders Placed This Encounter   Procedures    Ambulatory Referral to Sleep Medicine     Referral Priority:   Routine     Referral Type:   Consultation     Requested Specialty:   Sleep Medicine     Number of Visits Requested:   1     Return in about 6 months (around 2/17/2024) for Recheck.     Cpap recommended for ongoing sleep apnea   Inspire guidelines/candidacy discussed  Recommended  patient being seen/evaluated by sleep medicine due to sleepiness scale and ahi     There are no Patient Instructions on file for this visit.

## 2023-08-17 ENCOUNTER — OFFICE VISIT (OUTPATIENT)
Dept: OTOLARYNGOLOGY | Facility: CLINIC | Age: 68
End: 2023-08-17
Payer: MEDICARE

## 2023-08-17 VITALS
RESPIRATION RATE: 16 BRPM | DIASTOLIC BLOOD PRESSURE: 97 MMHG | SYSTOLIC BLOOD PRESSURE: 170 MMHG | HEIGHT: 73 IN | HEART RATE: 72 BPM | BODY MASS INDEX: 29.82 KG/M2 | WEIGHT: 225 LBS | TEMPERATURE: 97.8 F

## 2023-08-17 DIAGNOSIS — G47.33 OSA (OBSTRUCTIVE SLEEP APNEA): Primary | ICD-10-CM

## 2023-08-17 DIAGNOSIS — R40.0 DAYTIME SOMNOLENCE: ICD-10-CM

## 2023-08-17 DIAGNOSIS — R06.83 SNORING: ICD-10-CM

## 2023-09-12 ENCOUNTER — TELEPHONE (OUTPATIENT)
Dept: OTOLARYNGOLOGY | Facility: CLINIC | Age: 68
End: 2023-09-12
Payer: MEDICARE

## 2024-02-14 ENCOUNTER — TELEPHONE (OUTPATIENT)
Dept: UROLOGY | Facility: CLINIC | Age: 69
End: 2024-02-14
Payer: MEDICARE

## 2024-02-14 DIAGNOSIS — R97.20 ELEVATED PROSTATE SPECIFIC ANTIGEN (PSA): ICD-10-CM

## 2024-02-14 DIAGNOSIS — R35.0 URINE FREQUENCY: Primary | ICD-10-CM

## 2024-02-16 ENCOUNTER — TELEPHONE (OUTPATIENT)
Dept: UROLOGY | Facility: CLINIC | Age: 69
End: 2024-02-16
Payer: MEDICARE

## 2024-02-19 ENCOUNTER — TELEPHONE (OUTPATIENT)
Dept: UROLOGY | Facility: CLINIC | Age: 69
End: 2024-02-19
Payer: MEDICARE

## 2024-02-19 NOTE — TELEPHONE ENCOUNTER
Caller: STACY CHRISTIANSON    Relationship to patient: PT        Patient is needing: PLEASE FAX LAB ORDER TO [facility] AT FAX: [phone] TODAY

## 2024-03-08 NOTE — PROGRESS NOTES
"Subjective    Mr. Bennett is 68 y.o. male    Chief Complaint: Elevated PSA    History of Present Illness    68-year-old male established patient follow-up for erectile dysfunction and enlarged prostate.  His PSA level this year's are normal 2.0.  He is pleased with his Coloplast Titan 3 piece implantable penile implant placed 8/31/2021.  He denies bothersome LUTS.     PSA                 Date  2.0  2-21-24  1.8                   10-21-22    The following portions of the patient's history were reviewed and updated as appropriate: allergies, current medications, past family history, past medical history, past social history, past surgical history and problem list.    Review of Systems      Current Outpatient Medications:     cetirizine (zyrTEC) 10 MG tablet, Take 1 tablet by mouth Daily., Disp: , Rfl:     losartan (COZAAR) 25 MG tablet, , Disp: , Rfl:     montelukast (SINGULAIR) 10 MG tablet, , Disp: , Rfl:     omeprazole (priLOSEC) 20 MG capsule, , Disp: , Rfl:     Past Medical History:   Diagnosis Date    Allergic rhinitis Years ago    Anxiety     Depression     Fatty liver     GERD (gastroesophageal reflux disease)     Hypertension     Kidney stones     Osteoarthritis     Pneumonia     PONV (postoperative nausea and vomiting)     Sleep apnea     CPAP MACHINE, BUT HAS NOT USED \"IN A WHILE\"       Past Surgical History:   Procedure Laterality Date    APPENDECTOMY      COLONOSCOPY      ENDOSCOPY      GALLBLADDER SURGERY      LIVER BIOPSY      PENILE PROSTHESIS IMPLANT N/A 8/31/2021    Procedure: 3-PIECE INFLATABLE PENILE PROSTHESIS PLACEMENT;  Surgeon: Yonatan Jaeger MD;  Location:  [facility];  Service: Urology;  Laterality: N/A;    SLEEP ENDOSCOPY N/A 4/7/2023    Procedure: Videosleep endoscopy;  Surgeon: Wil Jones MD;  Location:  [facility];  Service: ENT;  Laterality: N/A;    TONSILLECTOMY         Social History     Socioeconomic History    Marital status:    Tobacco Use    Smoking status: Never    " "Smokeless tobacco: Never   Vaping Use    Vaping status: Never Used   Substance and Sexual Activity    Alcohol use: Never    Drug use: Never    Sexual activity: Defer       Family History   Problem Relation Age of Onset    Liver disease Father     Diabetes Mother     Hypertension Mother        Objective    Temp 97.5 °F (36.4 °C)   Ht 185.4 cm (73\")   Wt 100 kg (220 lb 6.4 oz)   BMI 29.08 kg/m²     Physical Exam        Results for orders placed or performed in visit on 03/27/24   POC Urinalysis Dipstick, Multipro    Specimen: Urine   Result Value Ref Range    Color Yellow Yellow, Straw, Dark Yellow, Treva    Clarity, UA Clear Clear    Glucose, UA Negative Negative mg/dL    Bilirubin Negative Negative    Ketones, UA Negative Negative    Specific Gravity  1.030 1.005 - 1.030    Blood, UA Negative Negative    pH, Urine 6.5 5.0 - 8.0    Protein, POC Negative Negative mg/dL    Urobilinogen, UA Normal Normal, 0.2 E.U./dL    Nitrite, UA Negative Negative    Leukocytes Negative Negative     Assessment and Plan    Diagnoses and all orders for this visit:    1. Enlarged prostate (Primary)  -     POC Urinalysis Dipstick, Multipro    2. Urine frequency  -     PSA DIAGNOSTIC; Future      Doing well with normal PSA He will follow me in 1 year with preclinic PSA or sooner as needed.     I spent approximately 20 minutes providing clinical care for this patient; including review of patient's chart and provider documentation, face to face time spent with patient in examination room (obtaining history, performing physical exam, discussing diagnosis and management options), placing orders, and completing patient documentation.               " (2) potential problem

## 2024-03-27 ENCOUNTER — OFFICE VISIT (OUTPATIENT)
Dept: UROLOGY | Facility: CLINIC | Age: 69
End: 2024-03-27
Payer: MEDICARE

## 2024-03-27 VITALS — HEIGHT: 73 IN | BODY MASS INDEX: 29.21 KG/M2 | WEIGHT: 220.4 LBS | TEMPERATURE: 97.5 F

## 2024-03-27 DIAGNOSIS — N40.0 ENLARGED PROSTATE: Primary | ICD-10-CM

## 2024-03-27 DIAGNOSIS — R35.0 URINE FREQUENCY: ICD-10-CM

## 2024-03-27 LAB
BILIRUB BLD-MCNC: NEGATIVE MG/DL
CLARITY, POC: CLEAR
COLOR UR: YELLOW
GLUCOSE UR STRIP-MCNC: NEGATIVE MG/DL
KETONES UR QL: NEGATIVE
LEUKOCYTE EST, POC: NEGATIVE
NITRITE UR-MCNC: NEGATIVE MG/ML
PH UR: 6.5 [PH] (ref 5–8)
PROT UR STRIP-MCNC: NEGATIVE MG/DL
RBC # UR STRIP: NEGATIVE /UL
SP GR UR: 1.03 (ref 1–1.03)
UROBILINOGEN UR QL: NORMAL

## 2024-03-28 PROBLEM — N40.0 ENLARGED PROSTATE: Status: ACTIVE | Noted: 2024-03-28

## 2024-03-28 PROBLEM — R35.0 URINE FREQUENCY: Status: ACTIVE | Noted: 2024-03-28

## 2024-08-27 NOTE — PROGRESS NOTES
"Subjective    Mr. Bennett is 68 y.o. male    Chief Complaint: Enlarged Prostate    History of Present Illness  Patient with history of large prostate as well as elevated PSA in the past he is here for new problem with worsening voiding symptoms.  States since last seen he has noticed worsening flow stream his urine stops and starts he feels like he does not empty his bladder well and some nocturia.  He is not on an alpha-blocker at this time.  Patient has a penile prosthesis placed 08/31/2021 which is been excellent according to the patient since it was done.  Is very happy with the response.    The following portions of the patient's history were reviewed and updated as appropriate: allergies, current medications, past family history, past medical history, past social history, past surgical history and problem list.    Review of Systems   Genitourinary:  Positive for difficulty urinating and urgency.         Current Outpatient Medications:     cetirizine (zyrTEC) 10 MG tablet, Take 1 tablet by mouth Daily., Disp: , Rfl:     losartan (COZAAR) 25 MG tablet, , Disp: , Rfl:     montelukast (SINGULAIR) 10 MG tablet, , Disp: , Rfl:     omeprazole (priLOSEC) 20 MG capsule, , Disp: , Rfl:     tamsulosin (FLOMAX) 0.4 MG capsule 24 hr capsule, Take 1 capsule by mouth Every Night for 90 days., Disp: 30 capsule, Rfl: 2    Past Medical History:   Diagnosis Date    Allergic rhinitis Years ago    Anxiety     Depression     Fatty liver     GERD (gastroesophageal reflux disease)     Hypertension     Kidney stones     Osteoarthritis     Pneumonia     PONV (postoperative nausea and vomiting)     Sleep apnea     CPAP MACHINE, BUT HAS NOT USED \"IN A WHILE\"       Past Surgical History:   Procedure Laterality Date    APPENDECTOMY      COLONOSCOPY      ENDOSCOPY      GALLBLADDER SURGERY      LIVER BIOPSY      PENILE PROSTHESIS IMPLANT N/A 8/31/2021    Procedure: 3-PIECE INFLATABLE PENILE PROSTHESIS PLACEMENT;  Surgeon: Yonatan Jaeger, " "MD;  Location:  [facility];  Service: Urology;  Laterality: N/A;    SLEEP ENDOSCOPY N/A 4/7/2023    Procedure: Videosleep endoscopy;  Surgeon: Wil Jones MD;  Location:  [facility];  Service: ENT;  Laterality: N/A;    TONSILLECTOMY         Social History     Socioeconomic History    Marital status:    Tobacco Use    Smoking status: Never    Smokeless tobacco: Never   Vaping Use    Vaping status: Never Used   Substance and Sexual Activity    Alcohol use: Never    Drug use: Never    Sexual activity: Defer       Family History   Problem Relation Age of Onset    Liver disease Father     Diabetes Mother     Hypertension Mother        Objective    Temp 96.8 °F (36 °C)   Ht 185.4 cm (73\")   Wt 95.3 kg (210 lb 3.2 oz)   BMI 27.73 kg/m²     Physical Exam  Vitals reviewed.   Constitutional:       Appearance: Normal appearance.   HENT:      Head: Normocephalic and atraumatic.   Pulmonary:      Effort: Pulmonary effort is normal.   Skin:     Coloration: Skin is not pale.   Neurological:      Mental Status: He is alert.   Psychiatric:         Mood and Affect: Mood normal.         Behavior: Behavior normal.             Results for orders placed or performed in visit on 08/28/24   POC Urinalysis Dipstick, Multipro    Specimen: Urine   Result Value Ref Range    Color Yellow Yellow, Straw, Dark Yellow, Treva    Clarity, UA Clear Clear    Glucose, UA Negative Negative mg/dL    Bilirubin Negative Negative    Ketones, UA Negative Negative    Specific Gravity  1.025 1.005 - 1.030    Blood, UA Negative Negative    pH, Urine 7.0 5.0 - 8.0    Protein, POC Negative Negative mg/dL    Urobilinogen, UA Normal Normal, 0.2 E.U./dL    Nitrite, UA Negative Negative    Leukocytes Negative Negative     Bladder Scan interpretation  Estimation of residual urine via abdominal ultrasound  Residual Urine: 55 ml  Indication: Enlarged Prostate  Position: Supine  Examination: Incremental scanning of the suprapubic area using 3 MHz transducer " using copious amounts of acoustic gel.   Findings: An anechoic area was demonstrated which represented the bladder, with measurement of residual urine as noted. I inspected this myself. In that the residual urine was stable or insignificant, no treatment will be necessary at this time.     Assessment and Plan    Diagnoses and all orders for this visit:    1. Enlarged prostate (Primary)  -     POC Urinalysis Dipstick, Multipro  -     tamsulosin (FLOMAX) 0.4 MG capsule 24 hr capsule; Take 1 capsule by mouth Every Night for 90 days.  Dispense: 30 capsule; Refill: 2    2. BPH with obstruction/lower urinary tract symptoms  -     tamsulosin (FLOMAX) 0.4 MG capsule 24 hr capsule; Take 1 capsule by mouth Every Night for 90 days.  Dispense: 30 capsule; Refill: 2      We discussed the pros and cons of treating his symptoms which I think is related to obstructive changes from an enlarging prostate.  After discussion we will go ahead and start him on an alpha-blocker tamsulosin 0.4 mg once daily at nighttime.    Patient will follow-up as scheduled in March with Dr. Jaeger to discuss symptoms at that time.

## 2024-08-28 ENCOUNTER — OFFICE VISIT (OUTPATIENT)
Dept: UROLOGY | Facility: CLINIC | Age: 69
End: 2024-08-28
Payer: MEDICARE

## 2024-08-28 VITALS — TEMPERATURE: 96.8 F | HEIGHT: 73 IN | BODY MASS INDEX: 27.86 KG/M2 | WEIGHT: 210.2 LBS

## 2024-08-28 DIAGNOSIS — N13.8 BPH WITH OBSTRUCTION/LOWER URINARY TRACT SYMPTOMS: ICD-10-CM

## 2024-08-28 DIAGNOSIS — N40.1 BPH WITH OBSTRUCTION/LOWER URINARY TRACT SYMPTOMS: ICD-10-CM

## 2024-08-28 DIAGNOSIS — N40.0 ENLARGED PROSTATE: Primary | ICD-10-CM

## 2024-08-28 RX ORDER — TAMSULOSIN HYDROCHLORIDE 0.4 MG/1
1 CAPSULE ORAL NIGHTLY
Qty: 30 CAPSULE | Refills: 2 | Status: SHIPPED | OUTPATIENT
Start: 2024-08-28 | End: 2024-11-26

## 2024-11-21 DIAGNOSIS — N40.0 ENLARGED PROSTATE: ICD-10-CM

## 2024-11-21 DIAGNOSIS — N13.8 BPH WITH OBSTRUCTION/LOWER URINARY TRACT SYMPTOMS: ICD-10-CM

## 2024-11-21 DIAGNOSIS — N40.1 BPH WITH OBSTRUCTION/LOWER URINARY TRACT SYMPTOMS: ICD-10-CM

## 2024-11-21 RX ORDER — TAMSULOSIN HYDROCHLORIDE 0.4 MG/1
CAPSULE ORAL
Qty: 90 CAPSULE | Refills: 0 | Status: SHIPPED | OUTPATIENT
Start: 2024-11-21

## 2024-12-20 ENCOUNTER — TELEPHONE (OUTPATIENT)
Dept: UROLOGY | Facility: CLINIC | Age: 69
End: 2024-12-20
Payer: MEDICARE

## 2024-12-20 NOTE — TELEPHONE ENCOUNTER
Pt called and stated that he feels like he has a stricture he is still urinating now but says he feels like something is there.  Made him appt on Monday to see Daniel

## 2024-12-20 NOTE — PROGRESS NOTES
"Subjective    Mr. Bennett is 69 y.o. male    Chief Complaint: Slow Urinary stream    History of Present Illness  Patient with known enlarged prostate presents with worsening stream and flow that is occurred recently really this past week.  The only difference patient has had an upper respiratory illness requiring him to take decongestants.  States his stream and flow are weaker and has more difficulty initiating stream when he does go he feels there are times something is blocking his flow.  He has been on tamsulosin which he takes every other day.  He also has a penile prosthesis which has worked well and has having no difficulties with that.  No burning with urination or gross hematuria.  His urine is clear his postvoid residual bladder scan was 59 mL.    The following portions of the patient's history were reviewed and updated as appropriate: allergies, current medications, past family history, past medical history, past social history, past surgical history and problem list.    Review of Systems   Genitourinary:  Positive for difficulty urinating, frequency and urgency.         Current Outpatient Medications:     cetirizine (zyrTEC) 10 MG tablet, Take 1 tablet by mouth Daily., Disp: , Rfl:     losartan (COZAAR) 25 MG tablet, , Disp: , Rfl:     montelukast (SINGULAIR) 10 MG tablet, , Disp: , Rfl:     omeprazole (priLOSEC) 20 MG capsule, , Disp: , Rfl:     tamsulosin (FLOMAX) 0.4 MG capsule 24 hr capsule, TAKE 1 CAPSULE BY MOUTH ONCE DAILY AT NIGHT FOR 90 DAYS, Disp: 90 capsule, Rfl: 0    Past Medical History:   Diagnosis Date    Allergic rhinitis Years ago    Anxiety     Depression     Fatty liver     GERD (gastroesophageal reflux disease)     Hypertension     Kidney stones     Osteoarthritis     Pneumonia     PONV (postoperative nausea and vomiting)     Sleep apnea     CPAP MACHINE, BUT HAS NOT USED \"IN A WHILE\"       Past Surgical History:   Procedure Laterality Date    APPENDECTOMY      COLONOSCOPY      " "ENDOSCOPY      GALLBLADDER SURGERY      LIVER BIOPSY      PENILE PROSTHESIS IMPLANT N/A 8/31/2021    Procedure: 3-PIECE INFLATABLE PENILE PROSTHESIS PLACEMENT;  Surgeon: Yonatan Jaeger MD;  Location:  [facility];  Service: Urology;  Laterality: N/A;    SLEEP ENDOSCOPY N/A 4/7/2023    Procedure: Videosleep endoscopy;  Surgeon: Wil Jones MD;  Location:  [facility];  Service: ENT;  Laterality: N/A;    TONSILLECTOMY         Social History     Socioeconomic History    Marital status:    Tobacco Use    Smoking status: Never    Smokeless tobacco: Never   Vaping Use    Vaping status: Never Used   Substance and Sexual Activity    Alcohol use: Never    Drug use: Never    Sexual activity: Defer       Family History   Problem Relation Age of Onset    Liver disease Father     Diabetes Mother     Hypertension Mother        Objective    Temp 97 °F (36.1 °C)   Ht 185.4 cm (73\")   Wt 95.1 kg (209 lb 9.6 oz)   BMI 27.65 kg/m²     Physical Exam  Constitutional:       Appearance: Normal appearance.   HENT:      Head: Normocephalic and atraumatic.   Pulmonary:      Effort: Pulmonary effort is normal.   Genitourinary:     Comments: Digital rectal exam revealed a smooth symmetric prostate no suspicious lesions nodules asymmetry or firmness were palpated.  Skin:     General: Skin is warm and dry.   Neurological:      Mental Status: He is alert.   Psychiatric:         Mood and Affect: Mood normal.         Behavior: Behavior normal.             Results for orders placed or performed in visit on 12/23/24   POC Urinalysis Dipstick, Multipro    Collection Time: 12/23/24 10:24 AM    Specimen: Urine   Result Value Ref Range    Color Yellow Yellow, Straw, Dark Yellow, Treva    Clarity, UA Clear Clear    Glucose, UA Negative Negative mg/dL    Bilirubin Negative Negative    Ketones, UA Negative Negative    Specific Gravity  1.025 1.005 - 1.030    Blood, UA Negative Negative    pH, Urine 6.0 5.0 - 8.0    Protein, POC Negative " Negative mg/dL    Urobilinogen, UA Normal Normal, 0.2 E.U./dL    Nitrite, UA Negative Negative    Leukocytes Negative Negative     Bladder Scan interpretation  Estimation of residual urine via abdominal ultrasound  Residual Urine: 59 ml  Indication: Slow urinary stream  Position: Supine  Examination: Incremental scanning of the suprapubic area using 3 MHz transducer using copious amounts of acoustic gel.   Findings: An anechoic area was demonstrated which represented the bladder, with measurement of residual urine as noted. I inspected this myself. In that the residual urine was stable or insignificant, no treatment will be necessary at this time.     Assessment and Plan    Diagnoses and all orders for this visit:    1. Slow urinary stream (Primary)  -     POC Urinalysis Dipstick, Multipro    2. BPH with obstruction/lower urinary tract symptoms      Patient had had stable symptoms up to about a week ago he started developing increasing weakness of stream and flow hesitancy and at times a sensation like his urine flow was blocked as he was trying to void.  Concurrently had a upper respiratory infection and was taken decongestants I explained that decongestants can have a vasoconstrictive effect on the prostate causing urinary retention and difficulty urinating.    His bladder scan was 59 mL his urine is clear.    As he was taking the tamsulosin every other day I would like him to start taking it once daily if he should have no improvement in symptoms I want him to then take 2 daily.    Follow-up in 3 weeks if no improvement in symptoms at that time I will schedule him for cystoscopy for further evaluation.

## 2024-12-23 ENCOUNTER — OFFICE VISIT (OUTPATIENT)
Dept: UROLOGY | Facility: CLINIC | Age: 69
End: 2024-12-23
Payer: MEDICARE

## 2024-12-23 VITALS — HEIGHT: 73 IN | TEMPERATURE: 97 F | WEIGHT: 209.6 LBS | BODY MASS INDEX: 27.78 KG/M2

## 2024-12-23 DIAGNOSIS — N13.8 BPH WITH OBSTRUCTION/LOWER URINARY TRACT SYMPTOMS: ICD-10-CM

## 2024-12-23 DIAGNOSIS — R39.198 SLOW URINARY STREAM: Primary | ICD-10-CM

## 2024-12-23 DIAGNOSIS — N40.1 BPH WITH OBSTRUCTION/LOWER URINARY TRACT SYMPTOMS: ICD-10-CM

## 2024-12-23 PROCEDURE — 81001 URINALYSIS AUTO W/SCOPE: CPT | Performed by: PHYSICIAN ASSISTANT

## 2024-12-23 PROCEDURE — 1160F RVW MEDS BY RX/DR IN RCRD: CPT | Performed by: PHYSICIAN ASSISTANT

## 2024-12-23 PROCEDURE — 1159F MED LIST DOCD IN RCRD: CPT | Performed by: PHYSICIAN ASSISTANT

## 2024-12-23 PROCEDURE — 51798 US URINE CAPACITY MEASURE: CPT | Performed by: PHYSICIAN ASSISTANT

## 2024-12-23 PROCEDURE — 99214 OFFICE O/P EST MOD 30 MIN: CPT | Performed by: PHYSICIAN ASSISTANT

## 2025-01-14 NOTE — PROGRESS NOTES
"Subjective    Mr. Bennett is 69 y.o. male    Chief Complaint: Slow Urinary Stream    History of Present Illness  Patient I saw 12/23/2024 with weakening stream that was fairly sudden in onset.  Patient was taking tamsulosin every other day also he had taken a decongestant prior to the symptoms starting.  When I saw him I told him to take the tamsulosin daily and if he needed to go to 2 a day but he is taking 1 daily and has had improvement in symptoms he no longer has the weaker stream he is back now to his baseline.    The following portions of the patient's history were reviewed and updated as appropriate: allergies, current medications, past family history, past medical history, past social history, past surgical history and problem list.    Review of Systems   Genitourinary:  Negative for difficulty urinating, frequency and urgency.         Current Outpatient Medications:     cetirizine (zyrTEC) 10 MG tablet, Take 1 tablet by mouth Daily., Disp: , Rfl:     losartan (COZAAR) 25 MG tablet, , Disp: , Rfl:     montelukast (SINGULAIR) 10 MG tablet, , Disp: , Rfl:     omeprazole (priLOSEC) 20 MG capsule, , Disp: , Rfl:     tamsulosin (FLOMAX) 0.4 MG capsule 24 hr capsule, TAKE 1 CAPSULE BY MOUTH ONCE DAILY AT NIGHT FOR 90 DAYS, Disp: 90 capsule, Rfl: 0    Past Medical History:   Diagnosis Date    Allergic rhinitis Years ago    Anxiety     Depression     Fatty liver     GERD (gastroesophageal reflux disease)     Hypertension     Kidney stones     Osteoarthritis     Pneumonia     PONV (postoperative nausea and vomiting)     Sleep apnea     CPAP MACHINE, BUT HAS NOT USED \"IN A WHILE\"       Past Surgical History:   Procedure Laterality Date    APPENDECTOMY      COLONOSCOPY      ENDOSCOPY      GALLBLADDER SURGERY      LIVER BIOPSY      PENILE PROSTHESIS IMPLANT N/A 8/31/2021    Procedure: 3-PIECE INFLATABLE PENILE PROSTHESIS PLACEMENT;  Surgeon: Yonatan Jaeger MD;  Location: [facility];  Service: Urology;  Laterality: " "N/A;    SLEEP ENDOSCOPY N/A 4/7/2023    Procedure: Videosleep endoscopy;  Surgeon: Wil Jones MD;  Location: [facility] OR;  Service: ENT;  Laterality: N/A;    TONSILLECTOMY         Social History     Socioeconomic History    Marital status:    Tobacco Use    Smoking status: Never    Smokeless tobacco: Never   Vaping Use    Vaping status: Never Used   Substance and Sexual Activity    Alcohol use: Never    Drug use: Never    Sexual activity: Defer       Family History   Problem Relation Age of Onset    Liver disease Father     Diabetes Mother     Hypertension Mother        Objective    Temp 97.4 °F (36.3 °C)   Ht 185.4 cm (73\")   Wt 95.5 kg (210 lb 9.6 oz)   BMI 27.79 kg/m²     Physical Exam  Vitals reviewed.   Constitutional:       Appearance: Normal appearance.   HENT:      Head: Normocephalic and atraumatic.   Pulmonary:      Effort: Pulmonary effort is normal.   Neurological:      Mental Status: He is alert.   Psychiatric:         Mood and Affect: Mood normal.         Behavior: Behavior normal.             Results for orders placed or performed in visit on 01/17/25   POC Urinalysis Dipstick, Multipro    Collection Time: 01/17/25 10:02 AM    Specimen: Urine   Result Value Ref Range    Color Yellow Yellow, Straw, Dark Yellow, Treva    Clarity, UA Clear Clear    Glucose, UA Negative Negative mg/dL    Bilirubin Negative Negative    Ketones, UA Negative Negative    Specific Gravity  1.025 1.005 - 1.030    Blood, UA Negative Negative    pH, Urine 6.5 5.0 - 8.0    Protein, POC Negative Negative mg/dL    Urobilinogen, UA Normal Normal, 0.2 E.U./dL    Nitrite, UA Negative Negative    Leukocytes Negative Negative     Bladder Scan interpretation  Estimation of residual urine via abdominal ultrasound  Residual Urine: 44 ml  Indication: Slow Urinary Stream  Position: Supine  Examination: Incremental scanning of the suprapubic area using 3 MHz transducer using copious amounts of acoustic gel.   Findings: An " anechoic area was demonstrated which represented the bladder, with measurement of residual urine as noted. I inspected this myself. In that the residual urine was stable or insignificant, no treatment will be necessary at this time.     Assessment and Plan    Diagnoses and all orders for this visit:    1. Slow urinary stream (Primary)  -     POC Urinalysis Dipstick, Multipro    2. Enlarged prostate    Patient doing much better taking 1 tamsulosin daily which he started after his last visit.  States his symptoms are much improved his stream is stronger he is back to his baseline.  He will avoid decongestants in the future as this could have been a precipitating factor.  He has follow-up scheduled with Dr. Jaeger.

## 2025-01-17 ENCOUNTER — OFFICE VISIT (OUTPATIENT)
Dept: UROLOGY | Facility: CLINIC | Age: 70
End: 2025-01-17
Payer: MEDICARE

## 2025-01-17 VITALS — HEIGHT: 73 IN | TEMPERATURE: 97.4 F | WEIGHT: 210.6 LBS | BODY MASS INDEX: 27.91 KG/M2

## 2025-01-17 DIAGNOSIS — R39.198 SLOW URINARY STREAM: Primary | ICD-10-CM

## 2025-01-17 DIAGNOSIS — N40.0 ENLARGED PROSTATE: ICD-10-CM

## 2025-01-17 LAB
BILIRUB BLD-MCNC: NEGATIVE MG/DL
CLARITY, POC: CLEAR
COLOR UR: YELLOW
GLUCOSE UR STRIP-MCNC: NEGATIVE MG/DL
KETONES UR QL: NEGATIVE
LEUKOCYTE EST, POC: NEGATIVE
NITRITE UR-MCNC: NEGATIVE MG/ML
PH UR: 6.5 [PH] (ref 5–8)
PROT UR STRIP-MCNC: NEGATIVE MG/DL
RBC # UR STRIP: NEGATIVE /UL
SP GR UR: 1.02 (ref 1–1.03)
UROBILINOGEN UR QL: NORMAL

## 2025-03-06 NOTE — PROGRESS NOTES
"Subjective    Mr. Bennett is 69 y.o. male    Chief Complaint: Slow Urinary Stream    History of Present Illness  69-year-old male established patient follow-up for erectile dysfunction and enlarged prostate.  He was started on tamsulosin by Daniel Sommers last year for bothersome LUTS.  He has noticed side effect of diarrhea.  His PSA is stable at 1.9.   He is pleased with his Coloplast Titan 3 piece implantable penile implant placed 8/31/2021.     PSA 1.9 (3-21-25)    The following portions of the patient's history were reviewed and updated as appropriate: allergies, current medications, past family history, past medical history, past social history, past surgical history and problem list.    Review of Systems      Current Outpatient Medications:     cetirizine (zyrTEC) 10 MG tablet, Take 1 tablet by mouth Daily., Disp: , Rfl:     losartan (COZAAR) 25 MG tablet, , Disp: , Rfl:     montelukast (SINGULAIR) 10 MG tablet, , Disp: , Rfl:     omeprazole (priLOSEC) 20 MG capsule, , Disp: , Rfl:     alfuzosin (UROXATRAL) 10 MG 24 hr tablet, Take 1 tablet by mouth Every Night., Disp: 90 tablet, Rfl: 3    Past Medical History:   Diagnosis Date    Allergic rhinitis Years ago    Anxiety     Depression     Fatty liver     GERD (gastroesophageal reflux disease)     Hypertension     Kidney stones     Osteoarthritis     Pneumonia     PONV (postoperative nausea and vomiting)     Sleep apnea     CPAP MACHINE, BUT HAS NOT USED \"IN A WHILE\"       Past Surgical History:   Procedure Laterality Date    APPENDECTOMY      COLONOSCOPY      ENDOSCOPY      GALLBLADDER SURGERY      LIVER BIOPSY      PENILE PROSTHESIS IMPLANT N/A 8/31/2021    Procedure: 3-PIECE INFLATABLE PENILE PROSTHESIS PLACEMENT;  Surgeon: Yonatan Jaeger MD;  Location: [facility] OR;  Service: Urology;  Laterality: N/A;    SLEEP ENDOSCOPY N/A 4/7/2023    Procedure: Videosleep endoscopy;  Surgeon: Wil Jones MD;  Location:  [facility] OR;  Service: ENT;  Laterality: N/A; " "   TONSILLECTOMY         Social History     Socioeconomic History    Marital status:    Tobacco Use    Smoking status: Never    Smokeless tobacco: Never   Vaping Use    Vaping status: Never Used   Substance and Sexual Activity    Alcohol use: Never    Drug use: Never    Sexual activity: Defer       Family History   Problem Relation Age of Onset    Liver disease Father     Diabetes Mother     Hypertension Mother        Objective    Temp 97.5 °F (36.4 °C)   Ht 185.4 cm (73\")   Wt 95.9 kg (211 lb 6.4 oz)   BMI 27.89 kg/m²     Physical Exam        Results for orders placed or performed in visit on 03/26/25   POC Urinalysis Dipstick, Multipro    Collection Time: 03/26/25  9:23 AM    Specimen: Urine   Result Value Ref Range    Color Yellow Yellow, Straw, Dark Yellow, Treva    Clarity, UA Clear Clear    Glucose, UA Negative Negative mg/dL    Bilirubin Negative Negative    Ketones, UA Negative Negative    Specific Gravity  1.030 1.005 - 1.030    Blood, UA Negative Negative    pH, Urine 5.5 5.0 - 8.0    Protein, POC Negative Negative mg/dL    Urobilinogen, UA Normal Normal, 0.2 E.U./dL    Nitrite, UA Negative Negative    Leukocytes Negative Negative     Bladder Scan interpretation  Estimation of residual urine via abdominal ultrasound  Residual Urine: 61 ml  Indication: Slow Urinary Stream  Position: Supine  Examination: Incremental scanning of the suprapubic area using 3 MHz transducer using copious amounts of acoustic gel.   Findings: An anechoic area was demonstrated which represented the bladder, with measurement of residual urine as noted. I inspected this myself. In that the residual urine was stable or insignificant, no treatment will be necessary at this time.     IPSS Questionnaire (AUA-7):  Incomplete emptying  Over the past month, how often have you had a sensation of not emptying your bladder completely after you finished urinating?: About half the time (03/26/25 0918)  Frequency  Over the past month, " how often have you had to urinate again less than two hours after you finishied urinating ?: About half the time (03/26/25 0918)  Intermittency  Over the past month, how often have you found you stopped and started again several time when you urinated ?: More than half the time (03/26/25 0918)  Urgency  Over the last month, how often have you found it difficult  have you found it difficult to postpone urination ?: Not at all (03/26/25 0918)  Weak Stream  Over the past month, how often have you had a weak urinary stream ?: Less than 1 time in 5 (03/26/25 0918)  Straining  Over the past month, how often have you had to push or strain to begin urination ?: Not at all (03/26/25 0918)  Nocturia  Over the past month, how many times did you most typically get up to urinate from the time you went to bed until the time you got up in the morning ?: 1 time (03/26/25 0918)  Quality of life due to urinary symptoms  If you were to spend the rest of your life with your urinary condition the way it is now, how would feel about that?: Pleased (03/26/25 0918)    Scores  Total IPSS Score: (!) 12 (03/26/25 0918)  Total Score = Symptomatic Level: Moderately symptomatic: 8-19 (03/26/25 0918)        Assessment and Plan    Diagnoses and all orders for this visit:    1. Slow urinary stream (Primary)  -     POC Urinalysis Dipstick, Multipro  -     alfuzosin (UROXATRAL) 10 MG 24 hr tablet; Take 1 tablet by mouth Every Night.  Dispense: 90 tablet; Refill: 3    2. Urine frequency  -     PSA DIAGNOSTIC; Future    Doing well with normal PSA.  Given his perceived side effect of tamsulosin, he would like to switch to alfuzosin.  He will follow-up with me in 1 year with pre-clinic PSA or sooner as needed.      This document has been signed by ROLAN Jaeger MD on March 26, 2025 21:44 CDT

## 2025-03-19 ENCOUNTER — TELEPHONE (OUTPATIENT)
Dept: UROLOGY | Facility: CLINIC | Age: 70
End: 2025-03-19
Payer: MEDICARE

## 2025-03-26 ENCOUNTER — OFFICE VISIT (OUTPATIENT)
Dept: UROLOGY | Facility: CLINIC | Age: 70
End: 2025-03-26
Payer: MEDICARE

## 2025-03-26 VITALS — HEIGHT: 73 IN | TEMPERATURE: 97.5 F | WEIGHT: 211.4 LBS | BODY MASS INDEX: 28.02 KG/M2

## 2025-03-26 DIAGNOSIS — R35.0 URINE FREQUENCY: ICD-10-CM

## 2025-03-26 DIAGNOSIS — R39.198 SLOW URINARY STREAM: Primary | ICD-10-CM

## 2025-03-26 LAB
BILIRUB BLD-MCNC: NEGATIVE MG/DL
CLARITY, POC: CLEAR
COLOR UR: YELLOW
GLUCOSE UR STRIP-MCNC: NEGATIVE MG/DL
KETONES UR QL: NEGATIVE
LEUKOCYTE EST, POC: NEGATIVE
NITRITE UR-MCNC: NEGATIVE MG/ML
PH UR: 5.5 [PH] (ref 5–8)
PROT UR STRIP-MCNC: NEGATIVE MG/DL
RBC # UR STRIP: NEGATIVE /UL
SP GR UR: 1.03 (ref 1–1.03)
UROBILINOGEN UR QL: NORMAL

## 2025-03-26 RX ORDER — ALFUZOSIN HYDROCHLORIDE 10 MG/1
10 TABLET, EXTENDED RELEASE ORAL NIGHTLY
Qty: 90 TABLET | Refills: 3 | Status: SHIPPED | OUTPATIENT
Start: 2025-03-26

## (undated) DEVICE — PK TURNOVER RM ADV

## (undated) DEVICE — PENCL ES MEGADINE EZ/CLEAN BUTN W/HOLSTR 10FT

## (undated) DEVICE — SYR LL TP 10ML STRL

## (undated) DEVICE — TOWEL,OR,DSP,ST,BLUE,STD,4/PK,20PK/CS: Brand: MEDLINE

## (undated) DEVICE — DRSNG WND GZ CURAD OIL EMULSION 3X8IN STRL PK/3

## (undated) DEVICE — PLUG,CATHETER,DRAINAGE PROTECTOR,TUBE: Brand: MEDLINE

## (undated) DEVICE — SYR LUERLOK 50ML

## (undated) DEVICE — 4-PORT MANIFOLD: Brand: NEPTUNE 2

## (undated) DEVICE — TLC MALE UROLOGY RETRACTOR SYSTEM WITH FRAME (BOXED): Brand: TLC SELF-RETAINING RETRACTOR SYSTEM

## (undated) DEVICE — SUT PDS 2/0 CT2 27IN VIL PDP333H

## (undated) DEVICE — HDRST POSITIONING FM RND 2X9IN

## (undated) DEVICE — PREP RAZOR: Brand: DEROYAL

## (undated) DEVICE — SUT MNCRYL 3/0 PS2 18IN MCP497G

## (undated) DEVICE — KT ANTI FOG W/FLD AND SPNG

## (undated) DEVICE — APPL CHLORAPREP HI/LITE 26ML ORNG

## (undated) DEVICE — PROXIMATE RH ROTATING HEAD SKIN STAPLERS (35 WIDE) CONTAINS 35 STAINLESS STEEL STAPLES: Brand: PROXIMATE

## (undated) DEVICE — GLV SURG DERMASSURE GRN LF PF 8.0

## (undated) DEVICE — TOTAL TRAY, 16FR 10ML SIL FOLEY, URN: Brand: MEDLINE

## (undated) DEVICE — SURGICAL SUCTION CONNECTING TUBE WITH MALE CONNECTOR AND SUCTION CLAMP, 2 FT. LONG (.6 M), 5 MM I.D.: Brand: CONMED

## (undated) DEVICE — PAD MAJOR: Brand: MEDLINE INDUSTRIES, INC.

## (undated) DEVICE — ANTIBACTERIAL UNDYED BRAIDED (POLYGLACTIN 910), SYNTHETIC ABSORBABLE SUTURE: Brand: COATED VICRYL

## (undated) DEVICE — BANDAGE,GAUZE,BULKEE II,4.5"X4.1YD,STRL: Brand: MEDLINE

## (undated) DEVICE — TUBING, SUCTION, 1/4" X 12', STRAIGHT: Brand: MEDLINE

## (undated) DEVICE — COVER,MAYO STAND,STERILE: Brand: MEDLINE

## (undated) DEVICE — GLV SURG BIOGEL M LTX PF 7 1/2

## (undated) DEVICE — ASSEMBLY KIT: Brand: TITAN